# Patient Record
Sex: FEMALE | Race: WHITE
[De-identification: names, ages, dates, MRNs, and addresses within clinical notes are randomized per-mention and may not be internally consistent; named-entity substitution may affect disease eponyms.]

---

## 2019-11-26 ENCOUNTER — HOSPITAL ENCOUNTER (OUTPATIENT)
Dept: HOSPITAL 8 - LDOP | Age: 29
Discharge: HOME | End: 2019-11-26
Attending: OBSTETRICS & GYNECOLOGY
Payer: SELF-PAY

## 2019-11-26 VITALS — SYSTOLIC BLOOD PRESSURE: 106 MMHG | DIASTOLIC BLOOD PRESSURE: 60 MMHG

## 2019-11-26 VITALS — BODY MASS INDEX: 24.86 KG/M2 | HEIGHT: 63 IN | WEIGHT: 140.28 LBS

## 2019-11-26 DIAGNOSIS — O46.92: ICD-10-CM

## 2019-11-26 DIAGNOSIS — Z3A.22: ICD-10-CM

## 2019-11-26 LAB
BASOPHILS # BLD AUTO: 0.03 X10^3/UL (ref 0–0.1)
BASOPHILS NFR BLD AUTO: 0 % (ref 0–1)
EOSINOPHIL # BLD AUTO: 0.19 X10^3/UL (ref 0–0.4)
EOSINOPHIL NFR BLD AUTO: 2 % (ref 1–7)
ERYTHROCYTE [DISTWIDTH] IN BLOOD BY AUTOMATED COUNT: 13.4 % (ref 9.6–15.2)
LYMPHOCYTES # BLD AUTO: 1.38 X10^3/UL (ref 1–3.4)
LYMPHOCYTES NFR BLD AUTO: 15 % (ref 22–44)
MCH RBC QN AUTO: 30.7 PG (ref 27–34.8)
MCHC RBC AUTO-ENTMCNC: 33.6 G/DL (ref 32.4–35.8)
MCV RBC AUTO: 91.6 FL (ref 80–100)
MD: NO
MICROSCOPIC: (no result)
MONOCYTES # BLD AUTO: 0.42 X10^3/UL (ref 0.2–0.8)
MONOCYTES NFR BLD AUTO: 5 % (ref 2–9)
NEUTROPHILS # BLD AUTO: 7.19 X10^3/UL (ref 1.8–6.8)
NEUTROPHILS NFR BLD AUTO: 78 % (ref 42–75)
PLATELET # BLD AUTO: 214 X10^3/UL (ref 130–400)
PMV BLD AUTO: 9.1 FL (ref 7.4–10.4)
RBC # BLD AUTO: 3.73 X10^6/UL (ref 3.82–5.3)

## 2019-11-26 PROCEDURE — 87086 URINE CULTURE/COLONY COUNT: CPT

## 2019-11-26 PROCEDURE — 99211 OFF/OP EST MAY X REQ PHY/QHP: CPT

## 2019-11-26 PROCEDURE — 86592 SYPHILIS TEST NON-TREP QUAL: CPT

## 2019-11-26 PROCEDURE — 86900 BLOOD TYPING SEROLOGIC ABO: CPT

## 2019-11-26 PROCEDURE — 81001 URINALYSIS AUTO W/SCOPE: CPT

## 2019-11-26 PROCEDURE — 86762 RUBELLA ANTIBODY: CPT

## 2019-11-26 PROCEDURE — 87389 HIV-1 AG W/HIV-1&-2 AB AG IA: CPT

## 2019-11-26 PROCEDURE — G0463 HOSPITAL OUTPT CLINIC VISIT: HCPCS

## 2019-11-26 PROCEDURE — 87340 HEPATITIS B SURFACE AG IA: CPT

## 2019-11-26 PROCEDURE — 36415 COLL VENOUS BLD VENIPUNCTURE: CPT

## 2019-11-26 PROCEDURE — 76805 OB US >/= 14 WKS SNGL FETUS: CPT

## 2019-11-26 PROCEDURE — 86850 RBC ANTIBODY SCREEN: CPT

## 2019-11-26 PROCEDURE — 85025 COMPLETE CBC W/AUTO DIFF WBC: CPT

## 2019-12-15 ENCOUNTER — APPOINTMENT (OUTPATIENT)
Dept: RADIOLOGY | Facility: MEDICAL CENTER | Age: 29
End: 2019-12-15
Payer: OTHER MISCELLANEOUS

## 2019-12-15 ENCOUNTER — APPOINTMENT (OUTPATIENT)
Dept: RADIOLOGY | Facility: MEDICAL CENTER | Age: 29
End: 2019-12-15
Attending: OBSTETRICS & GYNECOLOGY
Payer: COMMERCIAL

## 2019-12-15 ENCOUNTER — HOSPITAL ENCOUNTER (OUTPATIENT)
Facility: MEDICAL CENTER | Age: 29
End: 2019-12-15
Attending: OBSTETRICS & GYNECOLOGY | Admitting: OBSTETRICS & GYNECOLOGY
Payer: COMMERCIAL

## 2019-12-15 ENCOUNTER — HOSPITAL ENCOUNTER (EMERGENCY)
Facility: MEDICAL CENTER | Age: 29
End: 2019-12-15
Attending: EMERGENCY MEDICINE
Payer: OTHER MISCELLANEOUS

## 2019-12-15 VITALS
DIASTOLIC BLOOD PRESSURE: 62 MMHG | HEIGHT: 63 IN | BODY MASS INDEX: 24.8 KG/M2 | RESPIRATION RATE: 16 BRPM | TEMPERATURE: 98.2 F | WEIGHT: 140 LBS | HEART RATE: 85 BPM | SYSTOLIC BLOOD PRESSURE: 110 MMHG | OXYGEN SATURATION: 98 %

## 2019-12-15 VITALS
SYSTOLIC BLOOD PRESSURE: 109 MMHG | BODY MASS INDEX: 25.76 KG/M2 | TEMPERATURE: 98.4 F | HEART RATE: 70 BPM | HEIGHT: 62 IN | DIASTOLIC BLOOD PRESSURE: 72 MMHG | WEIGHT: 140 LBS
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
## 2019-12-15 DIAGNOSIS — R10.31 RIGHT LOWER QUADRANT ABDOMINAL PAIN: ICD-10-CM

## 2019-12-15 DIAGNOSIS — Z3A.24 24 WEEKS GESTATION OF PREGNANCY: ICD-10-CM

## 2019-12-15 LAB
ADULT RBCS COUNTED 8505ARB2: 4950 ADULT RBC
ALBUMIN SERPL BCP-MCNC: 3.4 G/DL (ref 3.2–4.9)
ALBUMIN/GLOB SERPL: 1 G/DL
ALP SERPL-CCNC: 57 U/L (ref 30–99)
ALT SERPL-CCNC: 12 U/L (ref 2–50)
ANION GAP SERPL CALC-SCNC: 6 MMOL/L (ref 0–11.9)
APTT PPP: 25.8 SEC (ref 24.7–36)
AST SERPL-CCNC: 15 U/L (ref 12–45)
BILIRUB SERPL-MCNC: 0.5 MG/DL (ref 0.1–1.5)
BUN SERPL-MCNC: 11 MG/DL (ref 8–22)
CALCIUM SERPL-MCNC: 8.6 MG/DL (ref 8.5–10.5)
CHLORIDE SERPL-SCNC: 111 MMOL/L (ref 96–112)
CO2 SERPL-SCNC: 20 MMOL/L (ref 20–33)
CREAT SERPL-MCNC: 0.45 MG/DL (ref 0.5–1.4)
ERYTHROCYTE [DISTWIDTH] IN BLOOD BY AUTOMATED COUNT: 41.1 FL (ref 35.9–50)
ETHANOL BLD-MCNC: 0 G/DL
FETAL RBC PERCENT 8505FRBP: 0 %
FETAL STAIN FRBC 8505FRBC: 0 FETAL RBC
GLOBULIN SER CALC-MCNC: 3.4 G/DL (ref 1.9–3.5)
GLUCOSE SERPL-MCNC: 94 MG/DL (ref 65–99)
HCG SERPL QL: POSITIVE
HCT VFR BLD AUTO: 35.9 % (ref 37–47)
HGB BLD-MCNC: 11.8 G/DL (ref 12–16)
INR PPP: 0.93 (ref 0.87–1.13)
MCH RBC QN AUTO: 29.8 PG (ref 27–33)
MCHC RBC AUTO-ENTMCNC: 32.9 G/DL (ref 33.6–35)
MCV RBC AUTO: 90.7 FL (ref 81.4–97.8)
NUMBER OF RH DOSES IND 8505RD: NORMAL
NUMBER OF RH DOSES IND 8505RD: NORMAL # RHIG
PLATELET # BLD AUTO: 248 K/UL (ref 164–446)
PMV BLD AUTO: 10.7 FL (ref 9–12.9)
POTASSIUM SERPL-SCNC: 3.4 MMOL/L (ref 3.6–5.5)
PROT SERPL-MCNC: 6.8 G/DL (ref 6–8.2)
PROTHROMBIN TIME: 12.7 SEC (ref 12–14.6)
RBC # BLD AUTO: 3.96 M/UL (ref 4.2–5.4)
SODIUM SERPL-SCNC: 137 MMOL/L (ref 135–145)
WBC # BLD AUTO: 9.6 K/UL (ref 4.8–10.8)
WEAK D AG RBC QL: NORMAL

## 2019-12-15 PROCEDURE — 86901 BLOOD TYPING SEROLOGIC RH(D): CPT

## 2019-12-15 PROCEDURE — 36415 COLL VENOUS BLD VENIPUNCTURE: CPT

## 2019-12-15 PROCEDURE — 80053 COMPREHEN METABOLIC PANEL: CPT

## 2019-12-15 PROCEDURE — 80307 DRUG TEST PRSMV CHEM ANLYZR: CPT

## 2019-12-15 PROCEDURE — 99285 EMERGENCY DEPT VISIT HI MDM: CPT

## 2019-12-15 PROCEDURE — 85610 PROTHROMBIN TIME: CPT

## 2019-12-15 PROCEDURE — 84703 CHORIONIC GONADOTROPIN ASSAY: CPT

## 2019-12-15 PROCEDURE — 85460 HEMOGLOBIN FETAL: CPT

## 2019-12-15 PROCEDURE — 305948 HCHG GREEN TRAUMA ACT PRE-NOTIFY NO CC

## 2019-12-15 PROCEDURE — 59025 FETAL NON-STRESS TEST: CPT

## 2019-12-15 PROCEDURE — 85730 THROMBOPLASTIN TIME PARTIAL: CPT

## 2019-12-15 PROCEDURE — 85027 COMPLETE CBC AUTOMATED: CPT

## 2019-12-15 PROCEDURE — 76815 OB US LIMITED FETUS(S): CPT

## 2019-12-15 SDOH — HEALTH STABILITY: MENTAL HEALTH: HOW OFTEN DO YOU HAVE A DRINK CONTAINING ALCOHOL?: NEVER

## 2019-12-15 ASSESSMENT — PAIN SCALES - WONG BAKER: WONGBAKER_NUMERICALRESPONSE: HURTS A LITTLE MORE
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
## 2019-12-15 NOTE — ED NOTES
Pt verbalizes understanding of discharge and follow-up instructions.  VSS.  All questions answered. Pt to L&D for further monitoring on wheelchair.  Gave report to L&D charge RN.

## 2020-01-28 ENCOUNTER — INITIAL PRENATAL (OUTPATIENT)
Dept: OBGYN | Facility: CLINIC | Age: 30
End: 2020-01-28
Payer: MEDICAID

## 2020-01-28 VITALS — SYSTOLIC BLOOD PRESSURE: 120 MMHG | DIASTOLIC BLOOD PRESSURE: 62 MMHG | BODY MASS INDEX: 29.45 KG/M2 | WEIGHT: 161 LBS

## 2020-01-28 DIAGNOSIS — Z87.59 HISTORY OF IUFD: ICD-10-CM

## 2020-01-28 DIAGNOSIS — Z34.80 SUPERVISION OF OTHER NORMAL PREGNANCY: Primary | ICD-10-CM

## 2020-01-28 PROCEDURE — 0500F INITIAL PRENATAL CARE VISIT: CPT | Performed by: NURSE PRACTITIONER

## 2020-01-28 NOTE — PATIENT INSTRUCTIONS
P:  1.  GC/CT and Pap UTD per pt.         2.  28wk labs ordered - lab slip given        3.  Discussed PNV, diet, and adequate water intake        4.  NOB packet given        5.  Return to office in 2 wks        6.  Records requested.        7.  First trimester screening - too late.        8.  Tdap offered - pt would like to think about it.        9.  Instructions given on FKCs.      10.   Work restriction letter given.

## 2020-01-28 NOTE — LETTER
January 28, 2020              Lakia Rosario is currently being cared for at [facility].  Her hours/activities need to be modified.  She should not lift over 20 pounds.  She also needs frequent rest breaks (10 min/every 2 hr) in addition to her regularly scheduled meal breaks.  Please let me know if you have any questions.          Thank you,          PAN Cam.    Electronically Signed

## 2020-01-28 NOTE — PROGRESS NOTES
Pt. Here for NOB visit today.  # [phone]  First prenatal care  Pt. States no concerns   Pharmacy verified  Chaperone offered and accepted

## 2020-01-28 NOTE — LETTER
"Count Your Baby's Movements  Another step to a healthy delivery                Dept: [phone]    How Many Weeks Pregnant? 30w6d    Date to Begin Countin2020                How to use this chart    One way for your physician to keep track of your baby's health is by knowing how often the baby moves (or \"kicks\") in your womb.  You can help your physician to do this by using this chart every day.    Every day, you should see how many hours it takes for your baby to move 10 times.  Start in the morning, as soon as you get up.    · First, write down the time your baby moves until you get to 10.  · Check off one box every time your baby moves until you get to 10.  · Write down the time you finished counting in the last column.  · Total how long it took to count up all 10 movements.  · Finally, fill in the box that shows how long this took.  After counting 10 movements, you no longer have to count any more that day.  The next morning, just start counting again as soon as you get up.    What should you call a \"movement\"?  It is hard to say, because it will feel different from one mother to another and from one pregnancy to the next.  The important thing is that you count the movements the same way throughout your pregnancy.  If you have more questions, you should ask your physician.    Count carefully every day!  SAMPLE:  Week 28    How many hours did it take to feel 10 movements?       Start  Time     1     2     3     4     5     6     7     8     9     10   Finish Time   Mon 8:20 ·  ·  ·  ·  ·  ·  ·  ·  ·  ·  11:40                  Sat               Sun                 IMPORTANT: You should contact your physician if it takes more than two hours for you to feel 10 movements.  Each morning, write down the time and start to count the movements of your baby.  Keep track by checking off one box every time you feel one movement.  When you have felt 10 " "\"kicks\", write down the time you finished counting in the last column.  Then fill in the   box (over the check fatimah) for the number of hours it took.  Be sure to read the complete instructions on the previous page.            "

## 2020-01-28 NOTE — PROGRESS NOTES
Subjective:   Lakia Rosario is a 30 y.o.   @ EGA: 30w6d SABI: Estimated Date of Delivery: 20  per US who presents for her new OB exam.  She c/o cramping.  Too late for AFP.  Declines CF.  Reports good FM.  Denies VB, LOF, or cramping.  Denies dysuria, vaginal DC.  Pt is single and lives with boyfriend and child. Works at the [employer].  Pregnancy is unplanned but wanted.  Too late for [facility].  Reports PNC @ [facility] - hasn't been seen in awhile.     No chief complaint on file.          HPI  Review of Systems   All other systems reviewed and are negative.       Objective:     /62   Wt 73 kg (161 lb)   BMI 29.45 kg/m²     See H&P Prenatal Physical.          Wet mount: deferred        FHTs: 150        Fundal ht: 31       Physical Exam  Vitals signs and nursing note reviewed.   Constitutional:       Appearance: Normal appearance. She is well-developed.   HENT:      Head: Normocephalic and atraumatic.   Eyes:      Comments: Eye and ear exam deferred   Neck:      Musculoskeletal: Normal range of motion and neck supple.      Thyroid: No thyromegaly.   Cardiovascular:      Rate and Rhythm: Normal rate and regular rhythm.      Heart sounds: Normal heart sounds.   Pulmonary:      Effort: Pulmonary effort is normal.      Breath sounds: Normal breath sounds.   Abdominal:      General: Bowel sounds are normal.      Palpations: Abdomen is soft.   Genitourinary:     Exam position: Supine.      Vagina: Normal.      Comments: Deferred   x 1 - proven to 3100g  Musculoskeletal: Normal range of motion.   Skin:     General: Skin is warm and dry.      Capillary Refill: Capillary refill takes less than 2 seconds.   Neurological:      Mental Status: She is alert and oriented to person, place, and time.   Psychiatric:         Mood and Affect: Mood normal.         Behavior: Behavior normal.         Thought Content: Thought content normal.         Judgment: Judgment normal.            A:    1.  IUP @ 30w6d SABI: Estimated Date of Delivery: 4/1/20 per [facility]         2.  S=D        3.    Patient Active Problem List    Diagnosis Date Noted   • Supervision of other normal pregnancy 01/28/2020   • History of IUFD 01/28/2020         P:  1.  GC/CT and Pap UTD per pt.         2.  28wk labs ordered - lab slip given        3.  Discussed PNV, diet, and adequate water intake        4.  NOB packet given        5.  Return to office in 2 wks        6.  Records requested.        7.  First trimester screening - too late.        8.  Tdap offered - pt would like to think about it.        9.  Instructions given on FKCs.      10.   Work restriction letter given.

## 2020-02-11 ENCOUNTER — ROUTINE PRENATAL (OUTPATIENT)
Dept: OBGYN | Facility: CLINIC | Age: 30
End: 2020-02-11
Payer: MEDICAID

## 2020-02-11 VITALS — DIASTOLIC BLOOD PRESSURE: 64 MMHG | WEIGHT: 164 LBS | BODY MASS INDEX: 30 KG/M2 | SYSTOLIC BLOOD PRESSURE: 122 MMHG

## 2020-02-11 DIAGNOSIS — Z34.80 SUPERVISION OF OTHER NORMAL PREGNANCY: Primary | ICD-10-CM

## 2020-02-11 PROBLEM — Z87.59 HISTORY OF IUFD: Status: RESOLVED | Noted: 2020-01-28 | Resolved: 2020-02-11

## 2020-02-11 PROCEDURE — 90040 PR PRENATAL FOLLOW UP: CPT | Performed by: NURSE PRACTITIONER

## 2020-02-12 DIAGNOSIS — R73.09 ABNORMAL GTT (GLUCOSE TOLERANCE TEST): Primary | ICD-10-CM

## 2020-02-12 NOTE — PATIENT INSTRUCTIONS
P:  1.  GBS @ 36 wks.          2.  Continue FKCs.          3.  Questions answered.          4.  Encouraged pt to tour L&D.          5.  Encourage adequate water intake.        6.  F/u 2 wks.        7.  Labs are pending.        8.  D/w pt helps for pelvic pain. May use pregnancy belt.

## 2020-02-12 NOTE — PROGRESS NOTES
S:  Pt is  at 32w6d for routine OB follow up.  Reports some occ pelvic pressure and cramps.  Reports good FM.  Denies VB, LOF, RUCs or vaginal DC.    O:  Please see above vitals.        FHTs: 152        Fundal ht: 32 cm.    A:  IUP at 32w6d  Patient Active Problem List    Diagnosis Date Noted   • Supervision of other normal pregnancy 2020        P:  1.  GBS @ 36 wks.          2.  Continue FKCs.          3.  Questions answered.          4.  Encouraged pt to tour L&D.          5.  Encourage adequate water intake.        6.  F/u 2 wks.        7.  Labs are pending.        8.  D/w pt helps for pelvic pain. May use pregnancy belt.

## 2020-02-12 NOTE — PROGRESS NOTES
Pt here today for OB follow up  Pt states having irregular cx's, also having vaginal pressure.   Reports +FM  Good # [phone]  Pharmacy Confirmed.  Chaperone offered and declined.   Pt states did Labs today at [facility].

## 2020-02-13 LAB
ERYTHROCYTE [DISTWIDTH] IN BLOOD BY AUTOMATED COUNT: 13.8 % (ref 11.7–15.4)
GLUCOSE 1H P 50 G GLC PO SERPL-MCNC: 161 MG/DL (ref 65–139)
HCT VFR BLD AUTO: 33.5 % (ref 34–46.6)
HGB BLD-MCNC: 11 G/DL (ref 11.1–15.9)
MCH RBC QN AUTO: 29.3 PG (ref 26.6–33)
MCHC RBC AUTO-ENTMCNC: 32.8 G/DL (ref 31.5–35.7)
MCV RBC AUTO: 89 FL (ref 79–97)
PLATELET # BLD AUTO: 212 X10E3/UL (ref 150–450)
RBC # BLD AUTO: 3.76 X10E6/UL (ref 3.77–5.28)
TREPONEMA PALLIDUM IGG+IGM AB [PRESENCE] IN SERUM OR PLASMA BY IMMUNOASSAY: NON REACTIVE
WBC # BLD AUTO: 10.1 X10E3/UL (ref 3.4–10.8)

## 2020-02-18 ENCOUNTER — TELEPHONE (OUTPATIENT)
Dept: OBGYN | Facility: CLINIC | Age: 30
End: 2020-02-18

## 2020-02-18 NOTE — TELEPHONE ENCOUNTER
"Pt called c/o pelvic discomfort, denies any vaginal bleeding, LOF and UC. Pt states her pelvic are feels \"heavy\".. Adv pt she can use maternity support belt, hydrate and may take Tylenol for pain and labor precautions given. Pt understood and will comply, she had no other questions or concerns  "

## 2020-02-21 ENCOUNTER — HOSPITAL ENCOUNTER (OUTPATIENT)
Facility: MEDICAL CENTER | Age: 30
End: 2020-02-21
Attending: OBSTETRICS & GYNECOLOGY | Admitting: OBSTETRICS & GYNECOLOGY
Payer: MEDICAID

## 2020-02-21 VITALS
BODY MASS INDEX: 30.18 KG/M2 | RESPIRATION RATE: 18 BRPM | SYSTOLIC BLOOD PRESSURE: 117 MMHG | TEMPERATURE: 98 F | HEART RATE: 90 BPM | HEIGHT: 62 IN | WEIGHT: 164 LBS | DIASTOLIC BLOOD PRESSURE: 71 MMHG | OXYGEN SATURATION: 98 %

## 2020-02-21 LAB
APPEARANCE UR: ABNORMAL
APPEARANCE UR: ABNORMAL
BACTERIA #/AREA URNS HPF: NEGATIVE /HPF
BILIRUB UR QL STRIP.AUTO: NEGATIVE
COLOR UR AUTO: ABNORMAL
COLOR UR: ABNORMAL
EPI CELLS #/AREA URNS HPF: ABNORMAL /HPF
GLUCOSE UR QL STRIP.AUTO: NEGATIVE MG/DL
GLUCOSE UR STRIP.AUTO-MCNC: NEGATIVE MG/DL
HYALINE CASTS #/AREA URNS LPF: ABNORMAL /LPF
KETONES UR QL STRIP.AUTO: 15 MG/DL
KETONES UR STRIP.AUTO-MCNC: ABNORMAL MG/DL
LEUKOCYTE ESTERASE UR QL STRIP.AUTO: ABNORMAL
LEUKOCYTE ESTERASE UR QL STRIP.AUTO: ABNORMAL
MICRO URNS: ABNORMAL
NITRITE UR QL STRIP.AUTO: NEGATIVE
NITRITE UR QL STRIP.AUTO: NEGATIVE
PH UR STRIP.AUTO: 6 [PH] (ref 5–8)
PH UR STRIP.AUTO: 6 [PH] (ref 5–8)
PROT UR QL STRIP: 30 MG/DL
PROT UR QL STRIP: 30 MG/DL
RBC # URNS HPF: >150 /HPF
RBC UR QL AUTO: ABNORMAL
RBC UR QL AUTO: ABNORMAL
SP GR UR STRIP.AUTO: 1.02
SP GR UR: 1.02 (ref 1–1.03)
UROBILINOGEN UR STRIP.AUTO-MCNC: 1 MG/DL
WBC #/AREA URNS HPF: ABNORMAL /HPF

## 2020-02-21 PROCEDURE — 700111 HCHG RX REV CODE 636 W/ 250 OVERRIDE (IP): Performed by: OBSTETRICS & GYNECOLOGY

## 2020-02-21 PROCEDURE — 81001 URINALYSIS AUTO W/SCOPE: CPT

## 2020-02-21 PROCEDURE — 81002 URINALYSIS NONAUTO W/O SCOPE: CPT

## 2020-02-21 PROCEDURE — 700102 HCHG RX REV CODE 250 W/ 637 OVERRIDE(OP): Performed by: STUDENT IN AN ORGANIZED HEALTH CARE EDUCATION/TRAINING PROGRAM

## 2020-02-21 PROCEDURE — 96372 THER/PROPH/DIAG INJ SC/IM: CPT

## 2020-02-21 PROCEDURE — A9270 NON-COVERED ITEM OR SERVICE: HCPCS | Performed by: STUDENT IN AN ORGANIZED HEALTH CARE EDUCATION/TRAINING PROGRAM

## 2020-02-21 PROCEDURE — 59025 FETAL NON-STRESS TEST: CPT

## 2020-02-21 RX ORDER — ACETAMINOPHEN 325 MG/1
650 TABLET ORAL EVERY 4 HOURS PRN
Status: DISCONTINUED | OUTPATIENT
Start: 2020-02-21 | End: 2020-02-21 | Stop reason: HOSPADM

## 2020-02-21 RX ORDER — BETAMETHASONE SODIUM PHOSPHATE AND BETAMETHASONE ACETATE 3; 3 MG/ML; MG/ML
12 INJECTION, SUSPENSION INTRA-ARTICULAR; INTRALESIONAL; INTRAMUSCULAR; SOFT TISSUE ONCE
Status: COMPLETED | OUTPATIENT
Start: 2020-02-21 | End: 2020-02-21

## 2020-02-21 RX ADMIN — BETAMETHASONE SODIUM PHOSPHATE AND BETAMETHASONE ACETATE 12 MG: 3; 3 INJECTION, SUSPENSION INTRA-ARTICULAR; INTRALESIONAL; INTRAMUSCULAR at 13:14

## 2020-02-21 RX ADMIN — ACETAMINOPHEN 650 MG: 325 TABLET, FILM COATED ORAL at 12:31

## 2020-02-21 ASSESSMENT — PAIN SCALES - GENERAL: PAINLEVEL: 5 - MODERATE PAIN

## 2020-02-21 NOTE — PROGRESS NOTES
Presents at 34.2 wk for constant sharp lower back pain that is worse when she has UCs. States she has been alex irregularly for many weeks. Also c/o blood in her urine. Denies burning when she voids. Denies LOF or VB; reports FM. Pt has mild right flank pain but pain is mostly in her middle lower back; reports pain as 5/10. Pt appears calm and comfortable. Also c/o constant perineal pressure. SVE 3/0/-4; no blood on exam glove. Urine dipped and sent to lab for UA. Water provided; instructed to hydrate since she is dehydrated which can cause cramping. RN awaiting for MDs to be available  1125; Report given to Dr. Hawkins; at bedside to evaluate  1220: Orders received from MD. MD informed pt that the blood in her urine is most likely caused by kidney stone, but there isn't much to be done while she is pregnant. Pain is tolerable and pt appears comfortable. Educated to drink more water and empty bladder when needed to flush it out. Pt can return here if pain gets worse and doesn't respond to heat pack and Tylenol. Educated about importance of betamethasone in case she delivers early.   1320: SVE 3-4/th/-4. Dr. Hawkins notified to make sure plan is still to discharge with this little change. MD reviewed tracing. D/c orders obtained. Pt is to return tomorrow at 1300 for 2nd steroid injection and SVE. PTL precautions provided; instructed to return earlier if UCs become stronger and closer together

## 2020-02-21 NOTE — DISCHARGE INSTRUCTIONS
General Instructions:  · If you think you are in labor, time contractions (lying on your left side) from the beginning of one contraction to the beginning of the next contraction for at least one hour.  · Increase fluid intake: you should consume 10-12 8 oz glasses of non-caffeinated fluid per day.  · Report any pressure or burning on urination to your physician.  · Monitor fetal movement: If you notice an absence or decrease in fetal movement, drink a large glass of water and rest on your side.  If there is no increase in movement, call your physician or go to the hospital for further evaluation.  · Report any sudden, sharp abdominal pain.  · Report any bleeding.  Spotting or pinkish discharge is normal after vaginal exam.  You may also spot after sexual intercourse.    Pre-term Labor (<37 weeks):  Call your physician or return to the hospital if:  · You have painless regular contractions more than 4 in one hour.  · Your water breaks (remember time and color).  · You have menstrual-like cramps, a low dull backache or pressure in your pelvis or back.  · Your baby does not move enough to complete the daily kick count (10 movements in 2 hours).  · Your baby moves much less often than on the days before or you have not felt your baby move all day.  · Please review the MEDICATION LIST section of your AFTER VISIT SUMMARY document.  · Take your medication as prescribed      Other Instructions  Take Tylenol and use hot packs for lower back pain. If pain gets worse, return here. Return to L&D tomorrow at 1 pm for 2nd steroid injection and cervical exam

## 2020-02-21 NOTE — PROGRESS NOTES
"[facility] LABOR AND DELIVERY TRIAGE PROGRESS NOTE    PATIENT ID:  NAME:  Lakia Rosario  MRN:               [id number]  YOB: 1990     30 y.o. female  at 34w2d.    Subjective: Pt presents for a sharp R sided LBP for the past several weeks that comes and goes, but is mostly constant, and worsens with contractions.  She is unsure what specifically causes the pain, but denies anything that alleviates it.  She has also noted blood in her urine starting last night.  Denies changes in pain, injury, or other changes last night prior to hematuria.  Denies dysuria, no blood when wiping, no blood in underwear, solely with urination.    Pregnancy complicated by None    positive  For CTXS. Infrequent   positive Feels pain in R lower back  negative for LOF  negative for vaginal bleeding.   positive for fetal movement    ROS: Patient denies any fever chills, nausea, vomiting, headache, chest pain, shortness of breath, or dysuria or unusual swelling of hands or feet.     Objective:    Vitals:    20 1000 20 1016 20 1018   BP:  117/71 117/71   Pulse:  90 90   Resp:   18   Temp:   36.7 °C (98 °F)   TempSrc:   Temporal   SpO2:   98%   Weight: 74.4 kg (164 lb)     Height: 1.575 m (5' 2\")       Temp (24hrs), Av.7 °C (98 °F), Min:36.7 °C (98 °F), Max:36.7 °C (98 °F)    General: No acute distress, resting comfortably in bed.  HEENT: normocephalic, nontraumatic, PERRLA, EOMI  Cardiovascular: Heart RRR with no murmurs, rubs or gallops. Distal Pulses 2+  Respiratory: symmetric chest expansion, lungs CTAB, with no wheezes, rales, rhonci  Abdomen: gravid, nontender  Musculoskeletal: strength 5/5 in four extremities  Neuro: non focal with no numbness, tingling or changes in sensation    Cervix:  3cm/0%/-4  [place]: Uterine irritability.   FHRM: Baseline 140, Accels present, single variable decel, moderate variability    Assessment: 30 y.o. female  at 34w2d. Pain possibly related to  labor.  " Hematuria possible kidney stone, no evidence of UTI.    Plan:   1. Patient is cleared to return home with family. Encouraged to see MD for increased painful uterine contractions @ 3-5, vaginal bleeding, loss of fluid, or other serious symptoms.  2. First dose betamethasone today  3. Return tomorrow for second dose betamethasone and recheck cervix  4. Labor precautions reviewed  5. Sufficient hydration for possible nephrolith vs physiologic hydronephrosis    Discussed case with Dr. Frank, [facility] Attending. Case was discussed and attending agreed with plan prior to discharge of patient.    Shawn Hwakins M.D.

## 2020-02-22 ENCOUNTER — HOSPITAL ENCOUNTER (OUTPATIENT)
Facility: MEDICAL CENTER | Age: 30
End: 2020-02-22
Attending: OBSTETRICS & GYNECOLOGY | Admitting: OBSTETRICS & GYNECOLOGY
Payer: MEDICAID

## 2020-02-22 VITALS
HEART RATE: 91 BPM | TEMPERATURE: 97.5 F | BODY MASS INDEX: 30.18 KG/M2 | SYSTOLIC BLOOD PRESSURE: 117 MMHG | WEIGHT: 164 LBS | DIASTOLIC BLOOD PRESSURE: 71 MMHG | HEIGHT: 62 IN

## 2020-02-22 LAB
APPEARANCE UR: ABNORMAL
COLOR UR AUTO: YELLOW
GLUCOSE UR QL STRIP.AUTO: 500 MG/DL
KETONES UR QL STRIP.AUTO: 15 MG/DL
LEUKOCYTE ESTERASE UR QL STRIP.AUTO: ABNORMAL
NITRITE UR QL STRIP.AUTO: NEGATIVE
PH UR STRIP.AUTO: 6 [PH] (ref 5–8)
PROT UR QL STRIP: ABNORMAL MG/DL
RBC UR QL AUTO: ABNORMAL
SP GR UR: 1.02 (ref 1–1.03)

## 2020-02-22 PROCEDURE — 81002 URINALYSIS NONAUTO W/O SCOPE: CPT

## 2020-02-22 PROCEDURE — 700111 HCHG RX REV CODE 636 W/ 250 OVERRIDE (IP): Performed by: OBSTETRICS & GYNECOLOGY

## 2020-02-22 PROCEDURE — 59025 FETAL NON-STRESS TEST: CPT

## 2020-02-22 PROCEDURE — 96372 THER/PROPH/DIAG INJ SC/IM: CPT

## 2020-02-22 RX ORDER — BETAMETHASONE SODIUM PHOSPHATE AND BETAMETHASONE ACETATE 3; 3 MG/ML; MG/ML
12 INJECTION, SUSPENSION INTRA-ARTICULAR; INTRALESIONAL; INTRAMUSCULAR; SOFT TISSUE ONCE
Status: COMPLETED | OUTPATIENT
Start: 2020-02-22 | End: 2020-02-22

## 2020-02-22 RX ADMIN — BETAMETHASONE SODIUM PHOSPHATE AND BETAMETHASONE ACETATE 12 MG: 3; 3 INJECTION, SUSPENSION INTRA-ARTICULAR; INTRALESIONAL; INTRAMUSCULAR at 13:49

## 2020-02-22 NOTE — PROGRESS NOTES
PT Returning for 2nd steroid shot.    1350 Report to jamel Allen to give steroid per MAR and not preform SVE.    1355 PT to return for decreased FM, LOF, VB or VB.

## 2020-02-27 ENCOUNTER — HOSPITAL ENCOUNTER (OUTPATIENT)
Facility: MEDICAL CENTER | Age: 30
End: 2020-02-27
Attending: NURSE PRACTITIONER
Payer: MEDICAID

## 2020-02-27 ENCOUNTER — ROUTINE PRENATAL (OUTPATIENT)
Dept: OBGYN | Facility: CLINIC | Age: 30
End: 2020-02-27
Payer: MEDICAID

## 2020-02-27 VITALS — SYSTOLIC BLOOD PRESSURE: 126 MMHG | DIASTOLIC BLOOD PRESSURE: 72 MMHG

## 2020-02-27 DIAGNOSIS — Z34.83 ENCOUNTER FOR SUPERVISION OF OTHER NORMAL PREGNANCY IN THIRD TRIMESTER: ICD-10-CM

## 2020-02-27 DIAGNOSIS — Z34.83 ENCOUNTER FOR SUPERVISION OF OTHER NORMAL PREGNANCY IN THIRD TRIMESTER: Primary | ICD-10-CM

## 2020-02-27 PROCEDURE — 87150 DNA/RNA AMPLIFIED PROBE: CPT

## 2020-02-27 PROCEDURE — 87081 CULTURE SCREEN ONLY: CPT

## 2020-02-27 PROCEDURE — 90040 PR PRENATAL FOLLOW UP: CPT | Performed by: NURSE PRACTITIONER

## 2020-02-27 ASSESSMENT — PATIENT HEALTH QUESTIONNAIRE - PHQ9: CLINICAL INTERPRETATION OF PHQ2 SCORE: 0

## 2020-02-28 NOTE — PROGRESS NOTES
S:  Pt is  at 35w1d here for routine OB follow up.  Reports 1-2 CTX/hr. Was seen in hospital on  for blood in urine. VE was 3/0/-4 at that time, she recd BMZ.  Reports good FM.  Denies VB, LOF, RUCs, or vaginal DC. Discussed getting GBS sooner in the event she does not make it to her due date.    O:  Please see above vitals.        FHTs: 140        Fundal ht: 35 cm.        S=D        Fetal position: vertex        VE: 350/-2.    A:  IUP at 35w1d  Patient Active Problem List    Diagnosis Date Noted   • Supervision of other normal pregnancy 2020       P:  1.  Labor precautions given.  Instructions given on where to go.  Pt receptive to              education.          2.  Questions answered.          4.  Reviewed PTL precautions.        5.  Continue FKCs.          6.  Encouraged adequate water intake        7.  D/w pt walking 30 min daily,        8.  F/u in 1 week

## 2020-02-28 NOTE — PROGRESS NOTES
Pt here today for OB follow up  Pt states off and on contractions - not consistent , Denies VB   Reports +FM  Good # [phone]  Pharmacy Confirmed w/ pt

## 2020-02-29 LAB — GP B STREP DNA SPEC QL NAA+PROBE: NEGATIVE

## 2020-03-05 ENCOUNTER — ROUTINE PRENATAL (OUTPATIENT)
Dept: OBGYN | Facility: CLINIC | Age: 30
End: 2020-03-05
Payer: MEDICAID

## 2020-03-05 VITALS — DIASTOLIC BLOOD PRESSURE: 60 MMHG | SYSTOLIC BLOOD PRESSURE: 124 MMHG | WEIGHT: 170.6 LBS | BODY MASS INDEX: 31.2 KG/M2

## 2020-03-05 DIAGNOSIS — Z34.83 ENCOUNTER FOR SUPERVISION OF OTHER NORMAL PREGNANCY IN THIRD TRIMESTER: Primary | ICD-10-CM

## 2020-03-05 PROCEDURE — 90715 TDAP VACCINE 7 YRS/> IM: CPT | Performed by: NURSE PRACTITIONER

## 2020-03-05 PROCEDURE — 90040 PR PRENATAL FOLLOW UP: CPT | Performed by: NURSE PRACTITIONER

## 2020-03-05 PROCEDURE — 90471 IMMUNIZATION ADMIN: CPT | Performed by: NURSE PRACTITIONER

## 2020-03-05 ASSESSMENT — FIBROSIS 4 INDEX: FIB4 SCORE: 0.59

## 2020-03-06 NOTE — PROGRESS NOTES
S:  Pt is  at 36w1d here for routine OB follow up.  Reports CTX, questionable ROM, she is not sure if it is fluid or discharge.  Reports good FM.  Denies VB, LOF, RUCs, or vaginal DC.     O:  Please see above vitals.        FHTs: 155        Fundal ht: 36 cm        Fetal position: vertex        S=D        VE: 3/50/-2, no change in exam        Nitrazine: equivocal, fern negative             A:  IUP at 36w1d  Patient Active Problem List    Diagnosis Date Noted   • Supervision of other normal pregnancy 2020       P:  1.  Continue FKCs.         2.  Labor precautions given.  Instructions given on where to go.  Pt receptive to education.         3.  Questions answered.         4.  Encouraged adequate water intake, walking 30-60 minutes daily, bounce ball, pelvic rocking       5.  GBS today       6.  F/u 1wk       7.  Will get 3 hr GTT done before next visit       8. TDAP today

## 2020-03-06 NOTE — PROGRESS NOTES
OB follow up   + fetal movement.  No VB, LOF or UC's.  Wt:  170.6     BP:124/60  Phone #  [phone]  Preferred pharmacy confirmed.  GBS negative  Labs, US, DANIEL given  No TDap given at first appt or second due to pt was thinking about it. TDap was given today per provider was okay. Pt tolerated on the right deltoid at 4:41pm  Was not told about the 3 hr gtt will do before next appt, instructions provided  Clear fluid came out vagina, contractions continued, went down thighs a bit

## 2020-03-09 LAB
GLUCOSE 3H P CHAL SERPL-MCNC: NORMAL MG/DL
GLUCOSE 3H P CHAL SERPL-MCNC: NORMAL MG/DL

## 2020-03-12 ENCOUNTER — ROUTINE PRENATAL (OUTPATIENT)
Dept: OBGYN | Facility: CLINIC | Age: 30
End: 2020-03-12
Payer: MEDICAID

## 2020-03-12 VITALS — WEIGHT: 171 LBS | DIASTOLIC BLOOD PRESSURE: 56 MMHG | BODY MASS INDEX: 31.27 KG/M2 | SYSTOLIC BLOOD PRESSURE: 100 MMHG

## 2020-03-12 DIAGNOSIS — Z34.83 ENCOUNTER FOR SUPERVISION OF OTHER NORMAL PREGNANCY IN THIRD TRIMESTER: Primary | ICD-10-CM

## 2020-03-12 PROCEDURE — 90040 PR PRENATAL FOLLOW UP: CPT | Performed by: NURSE PRACTITIONER

## 2020-03-12 ASSESSMENT — FIBROSIS 4 INDEX: FIB4 SCORE: 0.59

## 2020-03-12 NOTE — PROGRESS NOTES
S:  Pt is  at 37w1d here for routine OB follow up.  Reports CTXs and pelvic pressure. Pt very uncomfortable, moving slowly. Reports good FM.  Denies VB, LOF, RUCs, or vaginal DC.     O:  Please see above vitals.        FHTs: 140        Fundal ht: 37 cm        Fetal position: vertex        SVE: /-2        GBS negative  -- reviewed w pt.      A:  IUP at 37w1d  Patient Active Problem List    Diagnosis Date Noted   • Supervision of other normal pregnancy 2020       P:  1.  Anticipatory guidance given         2.  Labor precautions given.  Instructions given on where to go.  Pt receptive to education.         3.  D/w pt plan for VE and IOL referral at 38 wk visit.        4.  Questions answered.         5.  Encouraged adequate water intake, walking 30-60 min daily, pelvic rocking, birthing ball       6.  F/u at hospital, hospital visiting restrictions reviewed

## 2020-03-12 NOTE — PROGRESS NOTES
Pt here today for OB follow up @ 37w1d  Pt states denies VB and LOF  Reports +FM  Good # [phone]  Pharmacy Confirmed.  GBS -

## 2020-03-13 ENCOUNTER — HOSPITAL ENCOUNTER (OUTPATIENT)
Facility: MEDICAL CENTER | Age: 30
End: 2020-03-14
Attending: OBSTETRICS & GYNECOLOGY | Admitting: OBSTETRICS & GYNECOLOGY
Payer: MEDICAID

## 2020-03-13 ASSESSMENT — FIBROSIS 4 INDEX: FIB4 SCORE: 0.59

## 2020-03-14 VITALS
SYSTOLIC BLOOD PRESSURE: 121 MMHG | TEMPERATURE: 98.2 F | DIASTOLIC BLOOD PRESSURE: 80 MMHG | HEIGHT: 63 IN | HEART RATE: 83 BPM | BODY MASS INDEX: 30.3 KG/M2 | WEIGHT: 171 LBS

## 2020-03-14 PROCEDURE — 59025 FETAL NON-STRESS TEST: CPT

## 2020-03-14 NOTE — PROGRESS NOTES
5287- Pt presented to labor unit with occasional UC's since yesterday after MD office visit. States she was 4cm in office. Denies LOF and VB. Confirms fetal movements. EFM and TOCO applied. Abd soft and non tender to touch. POC discussed. Pt verbalized understanding.    2324- SVE 4/70/-2.  PATRICIA Mcrae CNM notified of pt status and reviewed fetal heart tracing remotely . Orders received to hydrate and monitor at this time.       0036- PATRICIA Mcrae CNM reviewed fetal heart tracing. Orders received to discharge pt home.     0123- Discharge instructions given and explained. Pt verbalized understanding. States she's feeling better. Abd soft and non tender to touch.     0130- Pt discharged home undelivered with FOB.

## 2020-03-20 PROBLEM — R73.09 ELEVATED GLUCOSE TOLERANCE TEST: Status: ACTIVE | Noted: 2020-03-20

## 2020-03-23 ENCOUNTER — ROUTINE PRENATAL (OUTPATIENT)
Dept: OBGYN | Facility: CLINIC | Age: 30
End: 2020-03-23
Payer: MEDICAID

## 2020-03-23 VITALS — BODY MASS INDEX: 30.82 KG/M2 | WEIGHT: 174 LBS | SYSTOLIC BLOOD PRESSURE: 102 MMHG | DIASTOLIC BLOOD PRESSURE: 60 MMHG

## 2020-03-23 DIAGNOSIS — O36.8191 DECREASED FETAL MOVEMENT DURING PREGNANCY, ANTEPARTUM, FETUS 1 OF MULTIPLE GESTATION: ICD-10-CM

## 2020-03-23 DIAGNOSIS — O09.93 ENCOUNTER FOR SUPERVISION OF HIGH RISK PREGNANCY IN THIRD TRIMESTER, ANTEPARTUM: Primary | ICD-10-CM

## 2020-03-23 LAB
NST ACOUSTIC STIMULATION: NORMAL
NST ACTION NECESSARY: NORMAL
NST ASSESSMENT: REACTIVE
NST BASELINE: 145
NST INDICATIONS: NORMAL
NST OTHER DATA: NORMAL
NST READ BY: NORMAL
NST RETURN: NORMAL
NST UTERINE ACTIVITY: NORMAL

## 2020-03-23 PROCEDURE — 90040 PR PRENATAL FOLLOW UP: CPT | Performed by: NURSE PRACTITIONER

## 2020-03-23 PROCEDURE — 59025 FETAL NON-STRESS TEST: CPT | Performed by: NURSE PRACTITIONER

## 2020-03-23 PROCEDURE — 59425 ANTEPARTUM CARE ONLY: CPT | Performed by: NURSE PRACTITIONER

## 2020-03-23 ASSESSMENT — FIBROSIS 4 INDEX: FIB4 SCORE: 0.59

## 2020-03-23 NOTE — PROGRESS NOTES
Pt is a 30 y.o.  at 38w5d with  Estimated Date of Delivery: 20 who presents for scheduled NST.      NST reactive per my read:    Indication for: decreased fetal movment  FHR baseline: 145  Variability: moderate  Accelerations: +  Decelerations: -  VAS: none  TOCO: no CTX noted

## 2020-03-23 NOTE — PROGRESS NOTES
S:  Pt is  at 38w5d here for routine OB follow up.  Reports increased pressure. Pt declines stripping of membranes.  Reports good FM.  Denies VB, LOF, RUCs, or vaginal DC.     O:  Please see above vitals.        FHTs: 145        Fundal ht: 38 cm        Fetal position: vertex        SVE: /2        GBS negative  -- reviewed w pt.      A:  IUP at 38w5d  Patient Active Problem List    Diagnosis Date Noted   • Elevated glucose tolerance test 2020   • Supervision of other normal pregnancy 2020       P:  1.  Anticipatory guidance given         2.  Labor precautions given.  Instructions given on where to go.  Pt receptive to education.         3.  Plan for VE and IOL referral at next visit.        4.  Questions answered.         5.  Encouraged adequate water intake, walking 30-60 min daily, pelvic rocking, birthing ball       6.  F/u 1wk       7.  NST today.

## 2020-03-23 NOTE — PROGRESS NOTES
Pt here today for OB follow up  Pt states she is having pelvic pressure   Reports +FM, pt states does not do DANIEL everyday. Not sure if baby is meeting kicks.     Good # [phone]   Pharmacy Confirmed.  Chaperone offered and not indicated.  GBS negative.

## 2020-03-27 ENCOUNTER — HOSPITAL ENCOUNTER (INPATIENT)
Facility: MEDICAL CENTER | Age: 30
LOS: 1 days | End: 2020-03-28
Attending: OBSTETRICS & GYNECOLOGY | Admitting: OBSTETRICS & GYNECOLOGY
Payer: MEDICAID

## 2020-03-27 LAB
BASOPHILS # BLD AUTO: 0.8 % (ref 0–1.8)
BASOPHILS # BLD: 0.08 K/UL (ref 0–0.12)
EOSINOPHIL # BLD AUTO: 0.07 K/UL (ref 0–0.51)
EOSINOPHIL NFR BLD: 0.7 % (ref 0–6.9)
ERYTHROCYTE [DISTWIDTH] IN BLOOD BY AUTOMATED COUNT: 44.5 FL (ref 35.9–50)
ERYTHROCYTE [DISTWIDTH] IN BLOOD BY AUTOMATED COUNT: 49.3 FL (ref 35.9–50)
HCT VFR BLD AUTO: 29.1 % (ref 37–47)
HCT VFR BLD AUTO: 39.8 % (ref 37–47)
HGB BLD-MCNC: 12.3 G/DL (ref 12–16)
HGB BLD-MCNC: 9.9 G/DL (ref 12–16)
HOLDING TUBE BB 8507: NORMAL
IMM GRANULOCYTES # BLD AUTO: 0.13 K/UL (ref 0–0.11)
IMM GRANULOCYTES NFR BLD AUTO: 1.3 % (ref 0–0.9)
LYMPHOCYTES # BLD AUTO: 1.91 K/UL (ref 1–4.8)
LYMPHOCYTES NFR BLD: 19 % (ref 22–41)
MCH RBC QN AUTO: 28.9 PG (ref 27–33)
MCH RBC QN AUTO: 29.6 PG (ref 27–33)
MCHC RBC AUTO-ENTMCNC: 30.9 G/DL (ref 33.6–35)
MCHC RBC AUTO-ENTMCNC: 34 G/DL (ref 33.6–35)
MCV RBC AUTO: 86.9 FL (ref 81.4–97.8)
MCV RBC AUTO: 93.4 FL (ref 81.4–97.8)
MONOCYTES # BLD AUTO: 0.69 K/UL (ref 0–0.85)
MONOCYTES NFR BLD AUTO: 6.9 % (ref 0–13.4)
NEUTROPHILS # BLD AUTO: 7.17 K/UL (ref 2–7.15)
NEUTROPHILS NFR BLD: 71.3 % (ref 44–72)
NRBC # BLD AUTO: 0 K/UL
NRBC BLD-RTO: 0 /100 WBC
PLATELET # BLD AUTO: 165 K/UL (ref 164–446)
PLATELET # BLD AUTO: 173 K/UL (ref 164–446)
PMV BLD AUTO: 11.4 FL (ref 9–12.9)
PMV BLD AUTO: 12.3 FL (ref 9–12.9)
RBC # BLD AUTO: 3.35 M/UL (ref 4.2–5.4)
RBC # BLD AUTO: 4.26 M/UL (ref 4.2–5.4)
WBC # BLD AUTO: 10.3 K/UL (ref 4.8–10.8)
WBC # BLD AUTO: 14.8 K/UL (ref 4.8–10.8)

## 2020-03-27 PROCEDURE — 59410 OBSTETRICAL CARE: CPT | Performed by: OBSTETRICS & GYNECOLOGY

## 2020-03-27 PROCEDURE — 36415 COLL VENOUS BLD VENIPUNCTURE: CPT

## 2020-03-27 PROCEDURE — 700111 HCHG RX REV CODE 636 W/ 250 OVERRIDE (IP): Performed by: NURSE PRACTITIONER

## 2020-03-27 PROCEDURE — 304965 HCHG RECOVERY SERVICES

## 2020-03-27 PROCEDURE — 85025 COMPLETE CBC W/AUTO DIFF WBC: CPT

## 2020-03-27 PROCEDURE — 700101 HCHG RX REV CODE 250

## 2020-03-27 PROCEDURE — 59409 OBSTETRICAL CARE: CPT

## 2020-03-27 PROCEDURE — 770002 HCHG ROOM/CARE - OB PRIVATE (112)

## 2020-03-27 PROCEDURE — 700102 HCHG RX REV CODE 250 W/ 637 OVERRIDE(OP): Performed by: NURSE PRACTITIONER

## 2020-03-27 PROCEDURE — A9270 NON-COVERED ITEM OR SERVICE: HCPCS | Performed by: NURSE PRACTITIONER

## 2020-03-27 PROCEDURE — A9270 NON-COVERED ITEM OR SERVICE: HCPCS | Performed by: STUDENT IN AN ORGANIZED HEALTH CARE EDUCATION/TRAINING PROGRAM

## 2020-03-27 PROCEDURE — 0KQM0ZZ REPAIR PERINEUM MUSCLE, OPEN APPROACH: ICD-10-PCS | Performed by: OBSTETRICS & GYNECOLOGY

## 2020-03-27 PROCEDURE — 700111 HCHG RX REV CODE 636 W/ 250 OVERRIDE (IP): Performed by: STUDENT IN AN ORGANIZED HEALTH CARE EDUCATION/TRAINING PROGRAM

## 2020-03-27 PROCEDURE — 700102 HCHG RX REV CODE 250 W/ 637 OVERRIDE(OP): Performed by: STUDENT IN AN ORGANIZED HEALTH CARE EDUCATION/TRAINING PROGRAM

## 2020-03-27 PROCEDURE — 85027 COMPLETE CBC AUTOMATED: CPT

## 2020-03-27 PROCEDURE — 700105 HCHG RX REV CODE 258: Performed by: NURSE PRACTITIONER

## 2020-03-27 RX ORDER — MISOPROSTOL 200 UG/1
800 TABLET ORAL
Status: DISCONTINUED | OUTPATIENT
Start: 2020-03-27 | End: 2020-03-27 | Stop reason: HOSPADM

## 2020-03-27 RX ORDER — SODIUM CHLORIDE, SODIUM LACTATE, POTASSIUM CHLORIDE, CALCIUM CHLORIDE 600; 310; 30; 20 MG/100ML; MG/100ML; MG/100ML; MG/100ML
INJECTION, SOLUTION INTRAVENOUS CONTINUOUS
Status: DISPENSED | OUTPATIENT
Start: 2020-03-27 | End: 2020-03-27

## 2020-03-27 RX ORDER — HYDROCODONE BITARTRATE AND ACETAMINOPHEN 10; 325 MG/1; MG/1
1 TABLET ORAL EVERY 4 HOURS PRN
Status: DISCONTINUED | OUTPATIENT
Start: 2020-03-27 | End: 2020-03-28 | Stop reason: HOSPADM

## 2020-03-27 RX ORDER — DOCUSATE SODIUM 100 MG/1
100 CAPSULE, LIQUID FILLED ORAL 2 TIMES DAILY PRN
Status: DISCONTINUED | OUTPATIENT
Start: 2020-03-27 | End: 2020-03-28 | Stop reason: HOSPADM

## 2020-03-27 RX ORDER — DEXTROSE, SODIUM CHLORIDE, SODIUM LACTATE, POTASSIUM CHLORIDE, AND CALCIUM CHLORIDE 5; .6; .31; .03; .02 G/100ML; G/100ML; G/100ML; G/100ML; G/100ML
INJECTION, SOLUTION INTRAVENOUS CONTINUOUS
Status: DISCONTINUED | OUTPATIENT
Start: 2020-03-27 | End: 2020-03-28 | Stop reason: HOSPADM

## 2020-03-27 RX ORDER — OXYTOCIN 10 [USP'U]/ML
10 INJECTION, SOLUTION INTRAMUSCULAR; INTRAVENOUS
Status: DISCONTINUED | OUTPATIENT
Start: 2020-03-27 | End: 2020-03-27 | Stop reason: HOSPADM

## 2020-03-27 RX ORDER — IBUPROFEN 800 MG/1
800 TABLET ORAL EVERY 8 HOURS PRN
Status: DISCONTINUED | OUTPATIENT
Start: 2020-03-27 | End: 2020-03-27

## 2020-03-27 RX ORDER — CITRIC ACID/SODIUM CITRATE 334-500MG
30 SOLUTION, ORAL ORAL EVERY 6 HOURS PRN
Status: DISCONTINUED | OUTPATIENT
Start: 2020-03-27 | End: 2020-03-27 | Stop reason: HOSPADM

## 2020-03-27 RX ORDER — MISOPROSTOL 200 UG/1
600 TABLET ORAL
Status: DISCONTINUED | OUTPATIENT
Start: 2020-03-27 | End: 2020-03-28 | Stop reason: HOSPADM

## 2020-03-27 RX ORDER — IBUPROFEN 800 MG/1
800 TABLET ORAL 3 TIMES DAILY PRN
Status: DISCONTINUED | OUTPATIENT
Start: 2020-03-27 | End: 2020-03-28 | Stop reason: HOSPADM

## 2020-03-27 RX ORDER — VITAMIN A ACETATE, BETA CAROTENE, ASCORBIC ACID, CHOLECALCIFEROL, .ALPHA.-TOCOPHEROL ACETATE, DL-, THIAMINE MONONITRATE, RIBOFLAVIN, NIACINAMIDE, PYRIDOXINE HYDROCHLORIDE, FOLIC ACID, CYANOCOBALAMIN, CALCIUM CARBONATE, FERROUS FUMARATE, ZINC OXIDE, CUPRIC OXIDE 3080; 12; 120; 400; 1; 1.84; 3; 20; 22; 920; 25; 200; 27; 10; 2 [IU]/1; UG/1; MG/1; [IU]/1; MG/1; MG/1; MG/1; MG/1; MG/1; [IU]/1; MG/1; MG/1; MG/1; MG/1; MG/1
1 TABLET, FILM COATED ORAL EVERY MORNING
Status: DISCONTINUED | OUTPATIENT
Start: 2020-03-27 | End: 2020-03-28 | Stop reason: HOSPADM

## 2020-03-27 RX ORDER — ACETAMINOPHEN 325 MG/1
650 TABLET ORAL EVERY 4 HOURS PRN
Status: DISCONTINUED | OUTPATIENT
Start: 2020-03-27 | End: 2020-03-28 | Stop reason: HOSPADM

## 2020-03-27 RX ORDER — HYDROCODONE BITARTRATE AND ACETAMINOPHEN 5; 325 MG/1; MG/1
1 TABLET ORAL EVERY 4 HOURS PRN
Status: DISCONTINUED | OUTPATIENT
Start: 2020-03-27 | End: 2020-03-28 | Stop reason: HOSPADM

## 2020-03-27 RX ORDER — ACETAMINOPHEN 325 MG/1
975 TABLET ORAL EVERY 6 HOURS PRN
Status: DISCONTINUED | OUTPATIENT
Start: 2020-03-27 | End: 2020-03-27

## 2020-03-27 RX ORDER — CARBOPROST TROMETHAMINE 250 UG/ML
250 INJECTION, SOLUTION INTRAMUSCULAR
Status: DISCONTINUED | OUTPATIENT
Start: 2020-03-27 | End: 2020-03-27 | Stop reason: HOSPADM

## 2020-03-27 RX ORDER — METHYLERGONOVINE MALEATE 0.2 MG/ML
0.2 INJECTION INTRAVENOUS
Status: DISCONTINUED | OUTPATIENT
Start: 2020-03-27 | End: 2020-03-27 | Stop reason: HOSPADM

## 2020-03-27 RX ORDER — SODIUM CHLORIDE, SODIUM LACTATE, POTASSIUM CHLORIDE, CALCIUM CHLORIDE 600; 310; 30; 20 MG/100ML; MG/100ML; MG/100ML; MG/100ML
INJECTION, SOLUTION INTRAVENOUS PRN
Status: DISCONTINUED | OUTPATIENT
Start: 2020-03-27 | End: 2020-03-28 | Stop reason: HOSPADM

## 2020-03-27 RX ORDER — LIDOCAINE HYDROCHLORIDE 10 MG/ML
INJECTION, SOLUTION INFILTRATION; PERINEURAL
Status: COMPLETED
Start: 2020-03-27 | End: 2020-03-27

## 2020-03-27 RX ORDER — METOCLOPRAMIDE HYDROCHLORIDE 5 MG/ML
10 INJECTION INTRAMUSCULAR; INTRAVENOUS EVERY 6 HOURS PRN
Status: DISCONTINUED | OUTPATIENT
Start: 2020-03-27 | End: 2020-03-28 | Stop reason: HOSPADM

## 2020-03-27 RX ADMIN — OXYTOCIN 41.67 MILLI-UNITS/MIN: 10 INJECTION, SOLUTION INTRAMUSCULAR; INTRAVENOUS at 12:40

## 2020-03-27 RX ADMIN — OXYTOCIN 1 MILLI-UNITS/MIN: 10 INJECTION, SOLUTION INTRAMUSCULAR; INTRAVENOUS at 08:00

## 2020-03-27 RX ADMIN — IBUPROFEN 800 MG: 800 TABLET, FILM COATED ORAL at 14:14

## 2020-03-27 RX ADMIN — FENTANYL CITRATE 100 MCG: 50 INJECTION, SOLUTION INTRAMUSCULAR; INTRAVENOUS at 09:45

## 2020-03-27 RX ADMIN — IBUPROFEN 800 MG: 800 TABLET, FILM COATED ORAL at 23:52

## 2020-03-27 RX ADMIN — ACETAMINOPHEN 650 MG: 325 TABLET, FILM COATED ORAL at 17:31

## 2020-03-27 RX ADMIN — SODIUM CHLORIDE, POTASSIUM CHLORIDE, SODIUM LACTATE AND CALCIUM CHLORIDE: 600; 310; 30; 20 INJECTION, SOLUTION INTRAVENOUS at 07:30

## 2020-03-27 RX ADMIN — LIDOCAINE HYDROCHLORIDE: 10 INJECTION, SOLUTION INFILTRATION; PERINEURAL at 11:15

## 2020-03-27 RX ADMIN — FENTANYL CITRATE 100 MCG: 50 INJECTION, SOLUTION INTRAMUSCULAR; INTRAVENOUS at 11:25

## 2020-03-27 SDOH — ECONOMIC STABILITY: TRANSPORTATION INSECURITY
IN THE PAST 12 MONTHS, HAS THE LACK OF TRANSPORTATION KEPT YOU FROM MEDICAL APPOINTMENTS OR FROM GETTING MEDICATIONS?: PATIENT DECLINED

## 2020-03-27 SDOH — ECONOMIC STABILITY: TRANSPORTATION INSECURITY
IN THE PAST 12 MONTHS, HAS LACK OF TRANSPORTATION KEPT YOU FROM MEETINGS, WORK, OR FROM GETTING THINGS NEEDED FOR DAILY LIVING?: PATIENT DECLINED

## 2020-03-27 SDOH — ECONOMIC STABILITY: FOOD INSECURITY: WITHIN THE PAST 12 MONTHS, THE FOOD YOU BOUGHT JUST DIDN'T LAST AND YOU DIDN'T HAVE MONEY TO GET MORE.: PATIENT DECLINED

## 2020-03-27 SDOH — ECONOMIC STABILITY: FOOD INSECURITY: WITHIN THE PAST 12 MONTHS, YOU WORRIED THAT YOUR FOOD WOULD RUN OUT BEFORE YOU GOT MONEY TO BUY MORE.: PATIENT DECLINED

## 2020-03-27 ASSESSMENT — LIFESTYLE VARIABLES: EVER_SMOKED: NEVER

## 2020-03-27 ASSESSMENT — FIBROSIS 4 INDEX: FIB4 SCORE: 0.59

## 2020-03-27 NOTE — PROGRESS NOTES
0455 29y/o  edc 2020, EGA 39/ , Here to l&d room S218 with FOB. C/O vaginal bleeding. EFM/TOCO applied, patients states positive fetal movement. SVE 5/80/-2, moderate bleeding noted, no tears or lacerations. MILAGRO Limon CNM updated and coming to bedside.  0615 MILAGRO Limon at patients bedside, attempted to AROM, prolonged decel, patient repositioned, O2 provided.   0700 report to BARRETT Jewell RN

## 2020-03-27 NOTE — PROGRESS NOTES
Pt received from l&d via w/c to room S-321.  Report received at bedside, assumed care of patient.  Pt alert and oriented, showing no s/s of distress.  Rates pain at 7/10 at this time.  Will medicate per orders.  Pt assessment complete, wnl.  Pt voided in l&d.  Instructed to call for assist for first time oob.  FOB at bedside and supportive.  Pt oriented to room, routine, security, and call/emergency light.  POC discussed.  Pt encouraged to call with needs, call light in reach.

## 2020-03-27 NOTE — CARE PLAN
Problem: Knowledge Deficit  Goal: Patient/Support person demonstrates understanding regarding the progression of labor, available options and participates in decision-making process  Outcome: MET   POC discussed and pt states understanding at this time, has no further questions.   Problem: Pain  Goal: Alleviation of Pain or a reduction in pain to the patient's comfort goal  Outcome: MET   Pt has no report of pain at this time and does not desire any pain medications, pt will notify RN if something changes.

## 2020-03-27 NOTE — H&P
History and Physical      Lakia Rosario is a 30 y.o. year old female  at 39w2d who presents for irreg CTXs and bleeding following intercourse. She has had an uncomplicated pregnancy. Her 1 hr GTT was elevated with a normal 3 hr GTT.  Hx of vaginal delivery in .    Subjective:   positive  For CTXS.   positive Feels pain   negative for LOF  positive for vaginal bleeding.   positive for fetal movement    ROS: Pertinent items are noted in HPI.    Past Medical History:   Diagnosis Date   • Pregnancy     states is 6 weeks.     No past surgical history on file.  OB History    Para Term  AB Living   3 1 1 0 1 1   SAB TAB Ectopic Molar Multiple Live Births   1 0 0 0   1      # Outcome Date GA Lbr Victorino/2nd Weight Sex Delivery Anes PTL Lv   3 Current            2 Term 04/03/15 38w0d  3.175 kg (7 lb) M Vag-Spont None  JEAN-CLAUDE   1 SAB 11 17w6d   M    FD     Social History     Socioeconomic History   • Marital status: Single     Spouse name: Not on file   • Number of children: 1   • Years of education: Not on file   • Highest education level: Not on file   Occupational History   • Not on file   Social Needs   • Financial resource strain: Not on file   • Food insecurity     Worry: Not on file     Inability: Not on file   • Transportation needs     Medical: Not on file     Non-medical: Not on file   Tobacco Use   • Smoking status: Never Smoker   • Smokeless tobacco: Never Used   Substance and Sexual Activity   • Alcohol use: No     Frequency: Never   • Drug use: No   • Sexual activity: Yes     Partners: Male     Comment: NONE   Lifestyle   • Physical activity     Days per week: Not on file     Minutes per session: Not on file   • Stress: Not on file   Relationships   • Social connections     Talks on phone: Not on file     Gets together: Not on file     Attends Muslim service: Not on file     Active member of club or organization: Not on file     Attends meetings of clubs or organizations: Not  "on file     Relationship status: Not on file   • Intimate partner violence     Fear of current or ex partner: Not on file     Emotionally abused: Not on file     Physically abused: Not on file     Forced sexual activity: Not on file   Other Topics Concern   • Not on file   Social History Narrative    ** Merged History Encounter **         ** Merged History Encounter **          Allergies: Nkda [no known drug allergy]    Current Facility-Administered Medications:   •  LR infusion, , Intravenous, Continuous, Mirela Limon, A.P.R.N.  •  D5LR infusion, , Intravenous, Continuous, Mirela Limon, A.P.R.N.  •  fentaNYL (SUBLIMAZE) injection 50 mcg, 50 mcg, Intravenous, Q HOUR PRN, Mirela Limon, A.P.R.N.  •  fentaNYL (SUBLIMAZE) injection 100 mcg, 100 mcg, Intravenous, Q HOUR PRN, Mirela Limon, A.P.R.N.  •  Na citrate-citric acid (BICITRA) 500-334 MG/5ML solution 30 mL, 30 mL, Oral, Q6HRS PRN, Mirela Limon, A.P.R.N.  •  oxytocin (PITOCIN) injection 10 Units, 10 Units, Intramuscular, Once PRN, Mirela Limon, A.P.R.N.  •  miSOPROStol (CYTOTEC) tablet 800 mcg, 800 mcg, Rectal, Once PRN, Mirela Limon, A.P.R.N.  •  methylergonovine (METHERGINE) injection 0.2 mg, 0.2 mg, Intramuscular, Once PRN, Mirela Limon, A.P.R.N.  •  carboPROST (HEMABATE) injection 250 mcg, 250 mcg, Intramuscular, Once PRN, Mirela Limon, A.P.R.N.    Prenatal care with [facility] starting at 24w4d with total of 9 visits with following problems:  Patient Active Problem List    Diagnosis Date Noted   • Elevated glucose tolerance test 03/20/2020   • Supervision of other normal pregnancy 01/28/2020         Objective:      /69   Pulse 76   Temp 36.5 °C (97.7 °F) (Temporal)   Resp 18   Ht 1.6 m (5' 2.99\")   Wt 72.9 kg (160 lb 12.4 oz)   SpO2 98%     General:   no acute distress   Skin:   normal   HEENT:  PERRLA   Lungs:   CTA bilateral   Heart:   S1, S2 normal, no murmur, click, rub or gallop, regular rate and rhythm, brisk carotid upstroke " without bruits, peripheral pulses very brisk, chest is clear without rales or wheezing, no pedal edema, no JVD, no hepatosplenomegaly   Abdomen:   gravid, NT   EFW:  5024-7398 g   Pelvis:  Exam deferred., proven to 3175 g   FHTs: 145 BPM + accels - decels moderate variability   Contractions: 3 contractions in 10 min moderate to palpation   Uterine Size: S=D   Presentations: Cephalic per RN   Cervix: Per RN    Dilation: 5cm    Effacement: 70%    Station:  -1    Consistency: Soft    Position: Middle     Complete OB US  Done through [facility]      Lab Review  Lab:   Blood type: O     Recent Results (from the past 5880 hour(s))   DIAGNOSTIC ALCOHOL    Collection Time: 12/15/19  1:01 PM   Result Value Ref Range    Diagnostic Alcohol 0.00 0.00 g/dL   CBC WITHOUT DIFFERENTIAL    Collection Time: 12/15/19  1:01 PM   Result Value Ref Range    WBC 9.6 4.8 - 10.8 K/uL    RBC 3.96 (L) 4.20 - 5.40 M/uL    Hemoglobin 11.8 (L) 12.0 - 16.0 g/dL    Hematocrit 35.9 (L) 37.0 - 47.0 %    MCV 90.7 81.4 - 97.8 fL    MCH 29.8 27.0 - 33.0 pg    MCHC 32.9 (L) 33.6 - 35.0 g/dL    RDW 41.1 35.9 - 50.0 fL    Platelet Count 248 164 - 446 K/uL    MPV 10.7 9.0 - 12.9 fL   Comp Metabolic Panel    Collection Time: 12/15/19  1:01 PM   Result Value Ref Range    Sodium 137 135 - 145 mmol/L    Potassium 3.4 (L) 3.6 - 5.5 mmol/L    Chloride 111 96 - 112 mmol/L    Co2 20 20 - 33 mmol/L    Anion Gap 6.0 0.0 - 11.9    Glucose 94 65 - 99 mg/dL    Bun 11 8 - 22 mg/dL    Creatinine 0.45 (L) 0.50 - 1.40 mg/dL    Calcium 8.6 8.5 - 10.5 mg/dL    AST(SGOT) 15 12 - 45 U/L    ALT(SGPT) 12 2 - 50 U/L    Alkaline Phosphatase 57 30 - 99 U/L    Total Bilirubin 0.5 0.1 - 1.5 mg/dL    Albumin 3.4 3.2 - 4.9 g/dL    Total Protein 6.8 6.0 - 8.2 g/dL    Globulin 3.4 1.9 - 3.5 g/dL    A-G Ratio 1.0 g/dL   Prothrombin Time    Collection Time: 12/15/19  1:01 PM   Result Value Ref Range    PT 12.7 12.0 - 14.6 sec    INR 0.93 0.87 - 1.13   APTT    Collection Time: 12/15/19   1:01 PM   Result Value Ref Range    APTT 25.8 24.7 - 36.0 sec   HCG QUAL SERUM    Collection Time: 12/15/19  1:01 PM   Result Value Ref Range    Beta-Hcg Qualitative Serum Positive (A) Negative   ESTIMATED GFR    Collection Time: 12/15/19  1:01 PM   Result Value Ref Range    GFR If African American >60 >60 mL/min/1.73 m 2    GFR If Non African American >60 >60 mL/min/1.73 m 2   FETAL HGB CALCULATION    Collection Time: 12/15/19  1:48 PM   Result Value Ref Range    Fetal Stain - FRBC 0 FETAL RBC    Adult RBCs Counted 4950 ADULT RBC    Fetal RBC Percent 0.00 %    Number Of Rh Doses Indicated Not Indicated # RhIg   HEMOGLOBIN F STAIN (KLEIHAUER-BETKE STAIN)    Collection Time: 12/15/19  1:57 PM   Result Value Ref Range    Rh With Weak D POS     Number Of Rh Doses Indicated ZERO    CBC WITHOUT DIFFERENTIAL    Collection Time: 02/11/20  7:01 AM   Result Value Ref Range    WBC 10.1 3.4 - 10.8 x10E3/uL    RBC 3.76 (L) 3.77 - 5.28 x10E6/uL    Hemoglobin 11.0 (L) 11.1 - 15.9 g/dL    Hematocrit 33.5 (L) 34.0 - 46.6 %    MCV 89 79 - 97 fL    MCH 29.3 26.6 - 33.0 pg    MCHC 32.8 31.5 - 35.7 g/dL    RDW 13.8 11.7 - 15.4 %    Platelet Count 212 150 - 450 x10E3/uL   TREPONEMA PALLIDUM ANTIBODIES    Collection Time: 02/11/20  7:01 AM   Result Value Ref Range    T. Pallidum Ab Non Reactive Non Reactive   GEST. DIABETES 1-HR SCREEN    Collection Time: 02/11/20  7:01 AM   Result Value Ref Range    Glucose, Post Dose 161 (H) 65 - 139 mg/dL   URINALYSIS    Collection Time: 02/21/20 10:25 AM   Result Value Ref Range    Color Lt. Red     Character Turbid (A)     Specific Gravity 1.021 <1.035    Ph 6.0 5.0 - 8.0    Glucose Negative Negative mg/dL    Ketones Trace (A) Negative mg/dL    Protein 30 (A) Negative mg/dL    Bilirubin Negative Negative    Urobilinogen, Urine 1.0 Negative    Nitrite Negative Negative    Leukocyte Esterase Moderate (A) Negative    Occult Blood Large (A) Negative    Micro Urine Req Microscopic    URINE MICROSCOPIC  (W/UA)    Collection Time: 20 10:25 AM   Result Value Ref Range    WBC 5-10 (A) /hpf    RBC >150 (A) /hpf    Bacteria Negative None /hpf    Epithelial Cells Few /hpf    Hyaline Cast 0-2 /lpf   POC UA    Collection Time: 20 10:31 AM   Result Value Ref Range    POC Color Red (A)     POC Appearance Turbid (A)     POC Glucose Negative Negative mg/dL    POC Ketones 15 (A) Negative mg/dL    POC Specific Gravity 1.025 1.005 - 1.030    POC Blood Large (A) Negative    POC Urine PH 6.0 5.0 - 8.0    POC Protein 30 (A) Negative mg/dL    POC Nitrites Negative Negative    POC Leukocyte Esterase Trace (A) Negative   POC UA    Collection Time: 20  1:37 PM   Result Value Ref Range    POC Color Yellow     POC Appearance Slightly Cloudy (A)     POC Glucose 500 (A) Negative mg/dL    POC Ketones 15 (A) Negative mg/dL    POC Specific Gravity 1.025 1.005 - 1.030    POC Blood Large (A) Negative    POC Urine PH 6.0 5.0 - 8.0    POC Protein Trace (A) Negative mg/dL    POC Nitrites Negative Negative    POC Leukocyte Esterase Small (A) Negative   GRP B STREP, BY PCR (BROWN BROTH)    Collection Time: 20  4:32 PM   Result Value Ref Range    Strep Gp B DNA PCR Negative Negative   GLUCOSE TOLERANCE 3 HOUR    Collection Time: 20 12:00 AM   Result Value Ref Range    Glucose 3 Hour 81, 159, 176, 92    GLUCOSE TOLERANCE 3 HOUR    Collection Time: 20 12:00 AM   Result Value Ref Range    Glucose 3 Hour Fastin, 1 hr: 159, 2 hr: 176, 3 hr: 92    POCT Fetal Nonstress Test    Collection Time: 20  2:17 PM   Result Value Ref Range    NST Indications decreased fetal movement     NST Baseline 145     NST Uterine Activity none     NST Acoustic Stimulation none     NST Assessment reactive     NST Action Necessary none     NST Other Data moderate variability     NST Return      NST Read By LISHA Limon         Assessment:   1.  IUP at 39w2d  2.  Labor status: Active phase labor.  3.  Cat 1 FHTs  4.  Obstetrical history  significant for   Patient Active Problem List    Diagnosis Date Noted   • Elevated glucose tolerance test 03/20/2020   • Supervision of other normal pregnancy 01/28/2020   .      Plan:     Admit to L&D  GBS negative  Routine labs  Pain management prn    Mirela Limon CNM, APRN

## 2020-03-27 NOTE — CARE PLAN
Problem: Altered physiologic condition related to immediate post-delivery state and potential for bleeding/hemorrhage  Goal: Patient physiologically stable as evidenced by normal lochia, palpable uterine involution and vital signs within normal limits  Outcome: PROGRESSING AS EXPECTED  Note: Fundus firm, lochia light, VSS.     Problem: Potential for postpartum infection related to presence of episiotomy/vaginal tear and/or uterine contamination  Goal: Patient will be absent from signs and symptoms of infection  Outcome: PROGRESSING AS EXPECTED  Note: Pt afebrile, showing no s/s of infection.

## 2020-03-27 NOTE — PROGRESS NOTES
0700  Report from NOC RN in room with pt.  Pt has no new complaints of pain at this time.  POC discussed and pt states understanding.  0725  Dr. Gonzalez in room, SVE with AROM=clear fluid, no cervical change at this time.  Orders for IV pitocin received at this time.  Pericare performed and pt has no new complaints. 0940  Pt reports feeling some pressure, SVE with mild cervical change made at this time.  Pt requests IV medications, IV pain medications given at this time.  1045  Pt reports more pressure, SVE with complete dilation at this time.  Delivery prep and pt pushing with UC's.  1103  Delivery of viable baby boy, apgars 8/9.  Second degree laceration repaired at this time with local by MD.  IV pain medications given for coverage during repair.  1200  Pt does not have urgency to void, pt understands to notify RN if she gets the feeling and not to get up on her own.  1240  Pt up to BR, voided and pericare performed at this time.  Pt does not desire any pain medications at this time.  Pt into W/C transferred to PP and report to PP RN.

## 2020-03-28 VITALS
DIASTOLIC BLOOD PRESSURE: 62 MMHG | RESPIRATION RATE: 18 BRPM | WEIGHT: 160.77 LBS | OXYGEN SATURATION: 96 % | BODY MASS INDEX: 28.48 KG/M2 | SYSTOLIC BLOOD PRESSURE: 104 MMHG | TEMPERATURE: 97.6 F | HEIGHT: 63 IN | HEART RATE: 78 BPM

## 2020-03-28 PROCEDURE — A9270 NON-COVERED ITEM OR SERVICE: HCPCS | Performed by: STUDENT IN AN ORGANIZED HEALTH CARE EDUCATION/TRAINING PROGRAM

## 2020-03-28 PROCEDURE — 700102 HCHG RX REV CODE 250 W/ 637 OVERRIDE(OP): Performed by: NURSE PRACTITIONER

## 2020-03-28 PROCEDURE — A9270 NON-COVERED ITEM OR SERVICE: HCPCS | Performed by: NURSE PRACTITIONER

## 2020-03-28 PROCEDURE — 700102 HCHG RX REV CODE 250 W/ 637 OVERRIDE(OP): Performed by: STUDENT IN AN ORGANIZED HEALTH CARE EDUCATION/TRAINING PROGRAM

## 2020-03-28 PROCEDURE — 700112 HCHG RX REV CODE 229: Performed by: STUDENT IN AN ORGANIZED HEALTH CARE EDUCATION/TRAINING PROGRAM

## 2020-03-28 RX ORDER — IBUPROFEN 800 MG/1
800 TABLET ORAL 3 TIMES DAILY PRN
Qty: 30 TAB | Refills: 1 | Status: SHIPPED | OUTPATIENT
Start: 2020-03-28

## 2020-03-28 RX ORDER — VITAMIN A ACETATE, BETA CAROTENE, ASCORBIC ACID, CHOLECALCIFEROL, .ALPHA.-TOCOPHEROL ACETATE, DL-, THIAMINE MONONITRATE, RIBOFLAVIN, NIACINAMIDE, PYRIDOXINE HYDROCHLORIDE, FOLIC ACID, CYANOCOBALAMIN, CALCIUM CARBONATE, FERROUS FUMARATE, ZINC OXIDE, CUPRIC OXIDE 3080; 12; 120; 400; 1; 1.84; 3; 20; 22; 920; 25; 200; 27; 10; 2 [IU]/1; UG/1; MG/1; [IU]/1; MG/1; MG/1; MG/1; MG/1; MG/1; [IU]/1; MG/1; MG/1; MG/1; MG/1; MG/1
1 TABLET, FILM COATED ORAL EVERY MORNING
Qty: 30 TAB | Refills: 1 | Status: SHIPPED | OUTPATIENT
Start: 2020-03-29 | End: 2021-07-21

## 2020-03-28 RX ORDER — FERROUS SULFATE 325(65) MG
325 TABLET ORAL DAILY
Qty: 30 TAB | Refills: 1 | Status: SHIPPED | OUTPATIENT
Start: 2020-03-28

## 2020-03-28 RX ORDER — PSEUDOEPHEDRINE HCL 30 MG
100 TABLET ORAL 2 TIMES DAILY PRN
Qty: 60 CAP | Refills: 1 | Status: SHIPPED | OUTPATIENT
Start: 2020-03-28 | End: 2021-07-21

## 2020-03-28 RX ADMIN — HYDROCODONE BITARTRATE AND ACETAMINOPHEN 1 TABLET: 5; 325 TABLET ORAL at 14:17

## 2020-03-28 RX ADMIN — VITAMIN A, VITAMIN C, VITAMIN D-3, VITAMIN E, VITAMIN B-1, VITAMIN B-2, NIACIN, VITAMIN B-6, CALCIUM, IRON, ZINC, COPPER 1 TABLET: 4000; 120; 400; 22; 1.84; 3; 20; 10; 1; 12; 200; 27; 25; 2 TABLET ORAL at 06:17

## 2020-03-28 RX ADMIN — HYDROCODONE BITARTRATE AND ACETAMINOPHEN 1 TABLET: 10; 325 TABLET ORAL at 02:37

## 2020-03-28 RX ADMIN — HYDROCODONE BITARTRATE AND ACETAMINOPHEN 1 TABLET: 5; 325 TABLET ORAL at 06:17

## 2020-03-28 RX ADMIN — DOCUSATE SODIUM 100 MG: 100 CAPSULE, LIQUID FILLED ORAL at 06:17

## 2020-03-28 ASSESSMENT — EDINBURGH POSTNATAL DEPRESSION SCALE (EPDS)
I HAVE FELT SCARED OR PANICKY FOR NO GOOD REASON: NO, NOT AT ALL
THE THOUGHT OF HARMING MYSELF HAS OCCURRED TO ME: NEVER
I HAVE BEEN ANXIOUS OR WORRIED FOR NO GOOD REASON: NO, NOT AT ALL
I HAVE LOOKED FORWARD WITH ENJOYMENT TO THINGS: AS MUCH AS I EVER DID
I HAVE BEEN SO UNHAPPY THAT I HAVE BEEN CRYING: NO, NEVER
I HAVE BLAMED MYSELF UNNECESSARILY WHEN THINGS WENT WRONG: NO, NEVER
I HAVE BEEN ABLE TO LAUGH AND SEE THE FUNNY SIDE OF THINGS: AS MUCH AS I ALWAYS COULD
I HAVE BEEN SO UNHAPPY THAT I HAVE HAD DIFFICULTY SLEEPING: NOT AT ALL
I HAVE FELT SAD OR MISERABLE: NO, NOT AT ALL
THINGS HAVE BEEN GETTING ON TOP OF ME: NO, I HAVE BEEN COPING AS WELL AS EVER

## 2020-03-28 NOTE — CARE PLAN
Problem: Altered physiologic condition related to immediate post-delivery state and potential for bleeding/hemorrhage  Goal: Patient physiologically stable as evidenced by normal lochia, palpable uterine involution and vital signs within normal limits  Outcome: PROGRESSING AS EXPECTED  Note: Fundus firm at U, lochia rubra minimal. V/S WNL     Problem: Alteration in comfort related to episiotomy, vaginal repair and/or after birth pains  Goal: Patient verbalizes acceptable pain level  Outcome: PROGRESSING AS EXPECTED  Note: Patient verbalized acceptable pain level at this time. Will be medicated as needed.

## 2020-03-28 NOTE — DISCHARGE SUMMARY
Discharge Summary:      Lakia Rosario      Admit Date:   3/27/2020  Discharge Date:  3/28/2020     Admitting diagnosis:  Pregnancy  Labor and delivery, indication for care  Discharge Diagnosis: Status post vaginal, spontaneous.  Pregnancy Complications: none  Tubal Ligation:  no        History:  Past Medical History:   Diagnosis Date   • Pregnancy     states is 6 weeks.     OB History    Para Term  AB Living   3 2 2 0 1 2   SAB TAB Ectopic Molar Multiple Live Births   1 0 0 0 0 2      # Outcome Date GA Lbr Victorino/2nd Weight Sex Delivery Anes PTL Lv   3 Term 20 39w2d 03:25 / 00:13 3.92 kg (8 lb 10.3 oz) M Vag-Spont Local N JEAN-CLAUDE   2 Term 04/03/15 38w0d  3.175 kg (7 lb) M Vag-Spont None  JEAN-CLAUDE   1 SAB 11 17w6d   M    FD        Nkda [no known drug allergy]  Patient Active Problem List    Diagnosis Date Noted   • Elevated glucose tolerance test 2020   • Supervision of other normal pregnancy 2020        Hospital Course:   30 y.o. , now para 2, was admitted with the above mentioned diagnosis, underwent Active Labor, vaginal, spontaneous. Patient postpartum course was unremarkable, with progressive advancement in diet , ambulation and toleration of oral analgesia. Patient without complaints today and desires discharge.      Vitals:    20 1800 20 2200 20 0200 20 0558   BP: 102/53 (!) 96/52 (!) 94/67 104/62   Pulse: 73 88 87 78   Resp: 20 18 18 18   Temp: 36.5 °C (97.7 °F) 36.2 °C (97.1 °F) 36.4 °C (97.6 °F) 36.4 °C (97.6 °F)   TempSrc: Temporal Temporal Temporal Temporal   SpO2: 95% 96% 97% 96%   Weight:       Height:           Current Facility-Administered Medications   Medication Dose   • D5LR infusion     • metoclopramide (REGLAN) injection 10 mg  10 mg   • oxytocin (PITOCIN) infusion (for postpartum)  2,000 mL/hr    Followed by   • oxytocin (PITOCIN) infusion (for postpartum)   mL/hr   • HYDROcodone-acetaminophen (NORCO) 5-325 MG per tablet 1  Tab  1 Tab   • HYDROcodone/acetaminophen (NORCO)  MG per tablet 1 Tab  1 Tab   • oxytocin (PITOCIN) 20 UNITS/1000ML LR (induction of labor)  0.5-20 marta-units/min   • LR infusion     • miSOPROStol (CYTOTEC) tablet 600 mcg  600 mcg   • PRN oxytocin (PITOCIN) (20 Units/1000 mL) PRN for excessive uterine bleeding - See Admin Instr  125-999 mL/hr   • docusate sodium (COLACE) capsule 100 mg  100 mg   • prenatal plus vitamin (STUARTNATAL 1+1) 27-1 MG tablet 1 Tab  1 Tab   • acetaminophen (TYLENOL) tablet 650 mg  650 mg   • ibuprofen (MOTRIN) tablet 800 mg  800 mg       Exam:  Breast Exam: negative  Abdomen: Abdomen soft, non-tender. BS normal. No masses,  No organomegaly  Fundus Non Tender: yes  Incision: none  Perineum: perineum intact  Extremity: extremities, peripheral pulses and reflexes normal     Labs:  Recent Labs     03/27/20 0525 03/27/20 2000   WBC 10.3 14.8*   RBC 4.26 3.35*   HEMOGLOBIN 12.3 9.9*   HEMATOCRIT 39.8 29.1*   MCV 93.4 86.9   MCH 28.9 29.6   MCHC 30.9* 34.0   RDW 49.3 44.5   PLATELETCT 173 165   MPV 11.4 12.3        Activity:   Discharge to home  Pelvic Rest x 6 weeks    Assessment:  normal postpartum course  Discharge Assessment: Taking adequate diet and fluids, No heavy bleeding or foul vaginal discharge , Voiding without difficulty    Plans for contraception: none as of yet, possibly the Nexplanon     Follow up: [facility] or [facility] in 5 weeks for vaginal; 1 week for incision check.   To resume daily PNV and iron supplement if needed with hydration.   Patient to RT [facility] or ER if any of the following occur:  Fever over 100.5  Severe abdominal pain  Red streaks or painful masses in the breasts  Foul smelling discharge or lochia  Heavy vaginal bleeding saturating a pad per hour  S/s of PP depression     Discharge Meds:   No current outpatient medications on file.       Krystyna Cerda M.D.

## 2020-03-28 NOTE — LACTATION NOTE
Mother states baby has been breastfeeding well, parents state baby has voided and stooled, she denies pain when she breastfeeds, she declines offer for assistance at this time, educated on expected feeding frequency and duration, educated on expectations regarding milk supply and infant intake when breastfeeding, educated on [id number], encouraged frequent [id number] and especially when breastfeeding, encouraged ad tamica breastfeeding at least Q 3 hours    Mother asked if it's ok to both breast and formula feed, assured her that at this time there is no medical indication for supplementation, also assured her that we support however she chooses to feed her baby, educated on potential impact of supplementation on maternal milk supply as well as infant breastfeeding, if she chooses to supplement encouraged to only offer small volumes of supplement after first breastfeeding    Mother states she has already been given written information on outpatient breastfeeding assistance available after discharge, encouraged to call for assistance if needed    Encouraged to call for assistance as needed

## 2020-03-28 NOTE — PROGRESS NOTES
Discharge instructions reviewed, verbalized understanding, papers signed. Prescribed med information given, reviewed, verbalized understanding.

## 2020-03-28 NOTE — DISCHARGE INSTRUCTIONS
POSTPARTUM DISCHARGE INSTRUCTIONS FOR MOM    YOB: 1990   Age: 30 y.o.               Admit Date: 3/27/2020     Discharge Date: 3/28/2020  Attending Doctor:  Frank Gonzalez M.D.                  Allergies:  Nkda [no known drug allergy]    Discharged to home by car. Discharged via wheelchair, hospital escort: Yes.  Special equipment needed: Not Applicable  Belongings with: Personal  Be sure to schedule a follow-up appointment with your primary care doctor or any specialists as instructed.     Discharge Plan:   Diet Plan: Discussed  Activity Level: Discussed  Confirmed Follow up Appointment: Patient to Call and Schedule Appointment  Confirmed Symptoms Management: Discussed  Medication Reconciliation Updated: Yes  Influenza Vaccine Indication: Not indicated: Previously immunized this influenza season and > 8 years of age    REASONS TO CALL YOUR OBSTETRICIAN:  1.   Persistent fever or shaking chills (Temperature higher than 100.4)  2.   Heavy bleeding (soaking more than 1 pad per hour); Passing clots  3.   Foul odor from vagina  4.   Mastitis (Breast infection; breast pain, chills, fever, redness)  5.   Urinary pain, burning or frequency  6.   Episiotomy infection  7.   Abdominal incision infection  8.   Severe depression longer than 24 hours    HAND WASHING  · Prior to handling the baby.  · Before breastfeeding or bottle feeding baby.  · After using the bathroom or changing the baby's diaper.    WOUND CARE  Ask your physician for additional care instructions.  In general:    ·  Incision:      · Keep clean and dry.    · Do NOT lift anything heavier than your baby for up to 6 weeks.    · There should not be any opening or pus.      VAGINAL CARE  · Nothing inside vagina for 6 weeks: no sexual intercourse, tampons or douching.  · Bleeding may continue for 2-4 weeks.  Amount may vary.    · Call your physician for heavy bleeding which means soaking more than 1 pad per hour    BIRTH CONTROL  · It is  "possible to become pregnant at any time after delivery and while breastfeeding.  · Plan to discuss a method of birth control with your physician at your follow up visit. visit.    DIET AND ELIMINATION  · Eating more fiber (bran cereal, fruits, and vegetables) and drinking plenty of fluids will help to avoid constipation.  · Urinary frequency after childbirth is normal.    POSTPARTUM BLUES  During the first few days after birth, you may experience a sense of the \"blues\" which may include impatience, irritability or even crying.  These feeling come and go quickly.  However, as many as 1 in 10 women experience emotional symptoms known as postpartum depression.    Postpartum depression:  May start as early as the second or third day after delivery or take several weeks or months to develop.  Symptoms of \"blues\" are present, but are more intense:  Crying spells; loss of appetite; feelings of hopelessness or loss of control; fear of touching the baby; over concern or no concern at all about the baby; little or no concern about your own appearance/caring for yourself; and/or inability to sleep or excessive sleeping.  Contact your physician if you are experiencing any of these symptoms.    Crisis Hotline:  · [place] Crisis Hotline:  6-980-VRZPMMO  Or 1-721.734.5556  · [place] Crisis Hotline:  1-732.800.6954  Or 642-707-9467    PREVENTING SHAKEN BABY:  If you are angry or stressed, PUT THE BABY IN THE CRIB, step away, take some deep breaths, and wait until you are calm to care for the baby.  DO NOT SHAKE THE BABY.  You are not alone, call a supporter for help.    · Crisis Call Center 24/7 crisis line 143-332-7992 or 1-367.185.6780  · You can also text them, text \"ANSWER\" to 292981    QUIT SMOKING/TOBACCO USE:  I understand the use of any tobacco products increases my chance of suffering from future heart disease and could cause other illnesses which may shorten my life. Quitting the use of tobacco products is the single most " important thing I can do to improve my health. For further information on smoking / tobacco cessation call a Toll Free Quit Line at 1-608.341.4652 (*[employer]) or 1-125.805.4116 (American Lung Association) or you can access the web based program at www.lungusa.org.    · [place] Tobacco Users Help Line:  (329) 425-2557       Toll Free: 1-292.812.6204  · Quit Tobacco Program [facility] (229)205-9817    DEPRESSION / SUICIDE RISK:  As you are discharged from this [facility], it is important to learn how to keep safe from harming yourself.    Recognize the warning signs:  · Abrupt changes in personality, positive or negative- including increase in energy   · Giving away possessions  · Change in eating patterns- significant weight changes-  positive or negative  · Change in sleeping patterns- unable to sleep or sleeping all the time   · Unwillingness or inability to communicate  · Depression  · Unusual sadness, discouragement and loneliness  · Talk of wanting to die  · Neglect of personal appearance   · Rebelliousness- reckless behavior  · Withdrawal from people/activities they love  · Confusion- inability to concentrate     If you or a loved one observes any of these behaviors or has concerns about self-harm, here's what you can do:  · Talk about it- your feelings and reasons for harming yourself  · Remove any means that you might use to hurt yourself (examples: pills, rope, extension cords, firearm)  · Get professional help from the community (Mental Health, Substance Abuse, psychological counseling)  · Do not be alone:Call your Safe Contact- someone whom you trust who will be there for you.  · Call your local CRISIS HOTLINE 872-5169 or 201-963-6054  · Call your local Children's Mobile Crisis Response Team [place] (977) 138-9469 or www.[place]  · Call the toll free National Suicide Prevention Hotlines   · National Suicide Prevention Lifeline 945-984-MQRW  (3590)  · [facility] 800-SUICIDE (780-1993)    DISCHARGE SURVEY:  Thank you for choosing [facility].  We hope we provided you with very good care.  You may be receiving a survey in the mail.  Please fill it out.  Your opinion is valuable to us.    ADDITIONAL EDUCATIONAL MATERIALS GIVEN TO PATIENT: none        My signature on this form indicates that:  1.  I have reviewed and understand the above information  2.  My questions regarding this information have been answered to my satisfaction.  3.  I have formulated a plan with my discharge nurse to obtain my prescribed medication for home.

## 2020-03-28 NOTE — CONSULTS
Lactation note:  Initial visit. Discussed normal  feeding behaviors and normal course of breastfeeding at 12-24-48-72 hours, and what to expect. Discussed importance of offering breast with feeding cues or at least every 2-3 hours, and even if infant shows no interest, can do hand expression into infant's lips. Showed MOB how to hand express colostrum. Multiple drops expressed. Encouraged to continue doing skin to skin. Discussed indicators of an ideal deep latch, voiding and stooling patterns, feeding cues, stomach size, and importance of establishing milk supply with frequency of feedings.  Plan for tonight is to continue to offer breast first, if not latching well, can hand express colostrum, and refeed to infant.    Encouraged her to continue to work on deep latch, and skin 2 skin, with hand expression. Assisted with latch in football hold to right breast. Infant not opening wide and is only latching onto the nipple. Mother is complaining of pain. Relatched, still shallow, but mother reports more comfort. Lips are flanged. Discussed what to expect next day.  Information and phone number to the [facility] & invited to breastfeeding circles. Encouraged to check [employer] website for latest information on when breastfeeding circles are resuming.    MOB has no other questions or concerns regarding breastfeeding. Encouraged to call for assistance as needed.

## 2020-03-28 NOTE — L&D DELIVERY NOTE
Vaginal Delivery Procedure Note:    Lakia Rosario is a 30 y.o. , now Para  admitted for labor.  Her labor course was uncomplicated and she progressed to completion without concerns.      PreDelivery Diagnosis:  1. SIUP @ 39w2d    PostDelivery Diagnosis:  1. S/p       Procedure in Detail:  Pt pushing dorsal lithotomy position.  Head delivered slowly with uncoordinated maternal effort.  With repositioning of patient's legs in Joseph, the head delivered easily in the MARYLOU position.  The right shoulder followed easily with gentle downwards pressure followed by the posterior shoulder and body.  There was a body cord.  Infant was placed on the maternal abdomen and was stimulated and bulb suctioned.  Cord clamping was delayed x30 seconds then the cord was clamped x2 and cut.  Infant APGARs 8 and 9 and 1 and 5 minutes, respectively.  Birth weight 3920g.  Placenta delivered spontaneously intact with 3 Vessel cord.  The vagina and perineum were examined revealing 2nd degree laceration which was repaired in the usual fashio.  The uterus was firm with IV pitocin and fundal massage.  Pt and infant left bonding in LDR.      Anesthesia - IV fentanyl  Sponge and needle counts correct.  Patient tolerated procedure well.    Anticipate routine postparum care.      Chio Hough DO  [facility], Women's Health

## 2020-04-17 ENCOUNTER — TELEPHONE (OUTPATIENT)
Dept: OBGYN | Facility: CLINIC | Age: 30
End: 2020-04-17

## 2020-04-17 NOTE — TELEPHONE ENCOUNTER
Called pt 2 x to let her know her FOB's FMLA forms are done and ready to be picked up (forms nned to be signed by employee(FOB) and unable to mail them) Both times pt answered and hung up the call.

## 2021-07-21 ENCOUNTER — OFFICE VISIT (OUTPATIENT)
Dept: URGENT CARE | Facility: PHYSICIAN GROUP | Age: 31
End: 2021-07-21

## 2021-07-21 VITALS
SYSTOLIC BLOOD PRESSURE: 110 MMHG | HEIGHT: 63 IN | HEART RATE: 73 BPM | WEIGHT: 143 LBS | DIASTOLIC BLOOD PRESSURE: 60 MMHG | TEMPERATURE: 97.3 F | OXYGEN SATURATION: 99 % | BODY MASS INDEX: 25.34 KG/M2

## 2021-07-21 DIAGNOSIS — R00.2 PALPITATIONS: ICD-10-CM

## 2021-07-21 PROCEDURE — 93000 ELECTROCARDIOGRAM COMPLETE: CPT | Performed by: PHYSICIAN ASSISTANT

## 2021-07-21 PROCEDURE — 99203 OFFICE O/P NEW LOW 30 MIN: CPT | Performed by: PHYSICIAN ASSISTANT

## 2021-07-21 ASSESSMENT — ENCOUNTER SYMPTOMS
SINUS PAIN: 0
FEVER: 0
TINGLING: 0
NAUSEA: 0
VOMITING: 0
MYALGIAS: 0
SORE THROAT: 1
WEAKNESS: 0
HEADACHES: 0
DIZZINESS: 0
NECK PAIN: 0
CHILLS: 0
SHORTNESS OF BREATH: 1
ABDOMINAL PAIN: 0
COUGH: 0
WEIGHT LOSS: 0
WHEEZING: 0
SPUTUM PRODUCTION: 0
PALPITATIONS: 1

## 2021-07-21 ASSESSMENT — FIBROSIS 4 INDEX: FIB4 SCORE: 0.79

## 2021-07-21 NOTE — PROGRESS NOTES
Subjective:   Lakia Rosario is a 31 y.o. female who presents for Breathing Problem (she get out of breath really fast, chest pain and she feels sstabing pain on her heart, she has pain on her throat no pain and no coughing, x1week)    HPI:  This is a very pleasant 31-year-old female presenting to the clinic with intermittent episodes of shortness of breath and chest pain.  She states her symptoms have been present for the last couple months.  They seem to come and go at random.  Over the last week episodes have been becoming more frequent.  She states her episodes happen predominantly at rest.  She states there is a fluttering sensation and she finds it hard to catch her breath.  She also has sharp stabbing sensations on her chest wall bilaterally.  Symptoms last for a few seconds and then resolved.  She occasionally feels slightly lightheaded.  She has associated sore throat when the event does occur.  States she has not had a cough.  Denies any preceding upper respiratory infection.  Denies any fevers, chills or myalgias.  Denies any lower extremity swelling, recent injury or surgery.  No history of clotting or cardiac history in the family.  She has not started any new medications.      Review of Systems   Constitutional: Negative for chills, fever, malaise/fatigue and weight loss.   HENT: Positive for sore throat. Negative for congestion and sinus pain.    Respiratory: Positive for shortness of breath. Negative for cough, sputum production and wheezing.    Cardiovascular: Positive for chest pain and palpitations. Negative for leg swelling.   Gastrointestinal: Negative for abdominal pain, nausea and vomiting.   Musculoskeletal: Negative for myalgias and neck pain.   Skin: Negative for rash.   Neurological: Negative for dizziness, tingling, weakness and headaches.       Medications:    • acetaminophen Tabs  • docusate sodium Caps  • ferrous sulfate  • ibuprofen Tabs  • PRENATAL 1 PO  • prenatal plus  "vitamin Tabs    Allergies: Nkda [no known drug allergy]    Problem List: Lakia Rosario does not have any pertinent problems on file.    Surgical History:  No past surgical history on file.    Past Social Hx: Lakia Rosario  reports that she has never smoked. She has never used smokeless tobacco. She reports that she does not drink alcohol and does not use drugs.     Past Family Hx:  Lakia Rosario family history is not on file.     Problem list, medications, and allergies reviewed by myself today in Epic.     Objective:     /60 (BP Location: Right arm, Patient Position: Sitting, BP Cuff Size: Adult)   Pulse 73   Temp 36.3 °C (97.3 °F) (Temporal)   Ht 1.6 m (5' 3\")   Wt 64.9 kg (143 lb)   SpO2 99%   BMI 25.33 kg/m²     Physical Exam  Constitutional:       General: She is not in acute distress.     Appearance: Normal appearance. She is not ill-appearing, toxic-appearing or diaphoretic.   HENT:      Head: Normocephalic and atraumatic.      Right Ear: Tympanic membrane, ear canal and external ear normal.      Left Ear: Tympanic membrane, ear canal and external ear normal.      Nose: Nose normal. No congestion or rhinorrhea.      Mouth/Throat:      Mouth: Mucous membranes are moist.      Pharynx: No oropharyngeal exudate or posterior oropharyngeal erythema.   Eyes:      Conjunctiva/sclera: Conjunctivae normal.   Neck:      Vascular: No carotid bruit.   Cardiovascular:      Rate and Rhythm: Normal rate and regular rhythm.      Pulses: Normal pulses.      Heart sounds: Normal heart sounds. No murmur heard.   No friction rub. No gallop.    Pulmonary:      Effort: Pulmonary effort is normal.      Breath sounds: Normal breath sounds. No wheezing, rhonchi or rales.      Comments: Patient has mild tenderness to palpation along the sternocostal junction bilaterally.  Chest:      Chest wall: Tenderness present.   Abdominal:      General: Bowel sounds are normal.      Palpations: Abdomen is soft. "      Tenderness: There is no abdominal tenderness. There is no guarding.   Musculoskeletal:      Cervical back: Normal range of motion. No muscular tenderness.   Lymphadenopathy:      Cervical: No cervical adenopathy.   Skin:     General: Skin is warm and dry.      Capillary Refill: Capillary refill takes less than 2 seconds.   Neurological:      General: No focal deficit present.      Mental Status: She is alert and oriented to person, place, and time. Mental status is at baseline.   Psychiatric:         Mood and Affect: Mood normal.         Thought Content: Thought content normal.       12- Lead EKG; interpreted by myself  Normal sinus rhythm with a rate of 66 bpm.   Normal axis.  Normal intervals.   No ST elevation or depression    No widening of QRS complex   Good R wave progression   No diagnostic Q waves.   No previous EKG for comparison.   Clinical Impression: Normal EKG      Assessment/Plan:     Comments/MDM:     • Differential diagnoses and treatment options were discussed with the patient at length today. Patient has been experiencing intermittent bouts of what is described as palpitations. Symptoms have been present for the last couple months seem to be becoming more frequent recently. She also has some mild tenderness to palpation over the sternal costal junction. Differentials at this time include palpitations versus costochondritis versus electrolyte abnormality versus anxiety versus PE. Wells score equals 0. Patient's EKG was performed in clinic with no acute findings. Vital signs stable and within normal limits. Lung sounds clear to auscultation. Encourage taking ibuprofen 600 mg 3 times daily for potential costochondritis inflammation. Discussed further imaging and lab work however the patient is self-pay during today's visit. She does have Medicaid so I encouraged her to go to [facility] urgent care as they accept Medicaid at that clinic and a further more affordable work-up can be performed. She does  have a pending appointment to establish care with a PCP on August 11. Discussed potential red flag symptoms that would warrant emergent follow-up. Encouraged to call with questions or concerns.     Diagnosis and associated orders:     1. Palpitations  EKG - Clinic Performed              Differential diagnosis, natural history, supportive care, and indications for immediate follow-up discussed.    Advised the patient to follow-up with the primary care physician for recheck, reevaluation, and consideration of further management.    Please note that this dictation was created using voice recognition software. I have made reasonable attempt to correct obvious errors, but I expect that there are errors of grammar and possibly content that I did not discover before finalizing the note.    This note was electronically signed by DARRELL Sosa PA-C

## 2023-08-08 ENCOUNTER — NON-PROVIDER VISIT (OUTPATIENT)
Dept: OCCUPATIONAL MEDICINE | Facility: CLINIC | Age: 33
End: 2023-08-08

## 2023-08-08 DIAGNOSIS — Z02.1 PRE-EMPLOYMENT HEALTH SCREENING EXAMINATION: ICD-10-CM

## 2023-08-08 DIAGNOSIS — Z02.1 PRE-EMPLOYMENT DRUG SCREENING: ICD-10-CM

## 2023-08-08 LAB
AMP AMPHETAMINE: NORMAL
COC COCAINE: NORMAL
INT CON NEG: NORMAL
INT CON POS: NORMAL
MET METHAMPHETAMINES: NORMAL
OPI OPIATES: NORMAL
PCP PHENCYCLIDINE: NORMAL
POC DRUG COMMENT 753798-OCCUPATIONAL HEALTH: NEGATIVE
THC: NORMAL

## 2023-08-08 PROCEDURE — 80305 DRUG TEST PRSMV DIR OPT OBS: CPT | Performed by: NURSE PRACTITIONER

## 2025-02-23 ENCOUNTER — OFFICE VISIT (OUTPATIENT)
Dept: URGENT CARE | Facility: PHYSICIAN GROUP | Age: 35
End: 2025-02-23
Payer: COMMERCIAL

## 2025-02-23 ENCOUNTER — RESULTS FOLLOW-UP (OUTPATIENT)
Dept: URGENT CARE | Facility: PHYSICIAN GROUP | Age: 35
End: 2025-02-23

## 2025-02-23 VITALS
BODY MASS INDEX: 28.88 KG/M2 | HEART RATE: 78 BPM | RESPIRATION RATE: 18 BRPM | OXYGEN SATURATION: 93 % | HEIGHT: 63 IN | TEMPERATURE: 98.3 F | SYSTOLIC BLOOD PRESSURE: 118 MMHG | WEIGHT: 163 LBS | DIASTOLIC BLOOD PRESSURE: 60 MMHG

## 2025-02-23 DIAGNOSIS — J02.9 SORE THROAT: ICD-10-CM

## 2025-02-23 DIAGNOSIS — B34.9 VIRAL SYNDROME: ICD-10-CM

## 2025-02-23 LAB
FLUAV RNA SPEC QL NAA+PROBE: NEGATIVE
FLUBV RNA SPEC QL NAA+PROBE: NEGATIVE
RSV RNA SPEC QL NAA+PROBE: NEGATIVE
SARS-COV-2 RNA RESP QL NAA+PROBE: NEGATIVE

## 2025-02-23 PROCEDURE — 99213 OFFICE O/P EST LOW 20 MIN: CPT | Performed by: NURSE PRACTITIONER

## 2025-02-23 PROCEDURE — 0241U POCT CEPHEID COV-2, FLU A/B, RSV - PCR: CPT | Performed by: NURSE PRACTITIONER

## 2025-02-23 PROCEDURE — 3074F SYST BP LT 130 MM HG: CPT | Performed by: NURSE PRACTITIONER

## 2025-02-23 PROCEDURE — 3078F DIAST BP <80 MM HG: CPT | Performed by: NURSE PRACTITIONER

## 2025-02-23 RX ORDER — BENZONATATE 100 MG/1
100 CAPSULE ORAL 3 TIMES DAILY PRN
Qty: 45 CAPSULE | Refills: 0 | Status: SHIPPED | OUTPATIENT
Start: 2025-02-23 | End: 2025-03-10

## 2025-02-23 RX ORDER — CETIRIZINE HYDROCHLORIDE 10 MG/1
10 TABLET ORAL DAILY
Qty: 30 TABLET | Refills: 0 | Status: SHIPPED | OUTPATIENT
Start: 2025-02-23

## 2025-02-23 RX ORDER — FLUTICASONE PROPIONATE 50 MCG
1 SPRAY, SUSPENSION (ML) NASAL DAILY
Qty: 16 G | Refills: 0 | Status: SHIPPED | OUTPATIENT
Start: 2025-02-23

## 2025-02-23 ASSESSMENT — ENCOUNTER SYMPTOMS
HOARSE VOICE: 1
COUGH: 1
VOMITING: 0
HEADACHES: 1
DIARRHEA: 0
TROUBLE SWALLOWING: 1

## 2025-02-23 NOTE — PATIENT INSTRUCTIONS
"Influenza, Adult  Influenza is also called \"the flu.\" It is an infection in the lungs, nose, and throat (respiratory tract). It spreads easily from person to person (is contagious). The flu causes symptoms that are like a cold, along with high fever and body aches.  What are the causes?  This condition is caused by the influenza virus. You can get the virus by:  Breathing in droplets that are in the air after a person infected with the flu coughed or sneezed.  Touching something that has the virus on it and then touching your mouth, nose, or eyes.  What increases the risk?  Certain things may make you more likely to get the flu. These include:  Not washing your hands often.  Having close contact with many people during cold and flu season.  Touching your mouth, eyes, or nose without first washing your hands.  Not getting a flu shot every year.  You may have a higher risk for the flu, and serious problems, such as a lung infection (pneumonia), if you:  Are older than 65.  Are pregnant.  Have a weakened disease-fighting system (immune system) because of a disease or because you are taking certain medicines.  Have a long-term (chronic) condition, such as:  Heart, kidney, or lung disease.  Diabetes.  Asthma.  Have a liver disorder.  Are very overweight (morbidly obese).  Have anemia.  What are the signs or symptoms?  Symptoms usually begin suddenly and last 4-14 days. They may include:  Fever and chills.  Headaches, body aches, or muscle aches.  Sore throat.  Cough.  Runny or stuffy (congested) nose.  Feeling discomfort in your chest.  Not wanting to eat as much as normal.  Feeling weak or tired.  Feeling dizzy.  Feeling sick to your stomach or throwing up.  How is this treated?  If the flu is found early, you can be treated with antiviral medicine. This can help to reduce how bad the illness is and how long it lasts. This may be given by mouth or through an IV tube.  Taking care of yourself at home can help your " symptoms get better. Your doctor may want you to:  Take over-the-counter medicines.  Drink plenty of fluids.  The flu often goes away on its own. If you have very bad symptoms or other problems, you may be treated in a hospital.  Follow these instructions at home:         Activity  Rest as needed. Get plenty of sleep.  Stay home from work or school as told by your doctor.  Do not leave home until you do not have a fever for 24 hours without taking medicine.  Leave home only to go to your doctor.  Eating and drinking  Take an ORS (oral rehydration solution). This is a drink that is sold at pharmacies and stores.  Drink enough fluid to keep your pee pale yellow.  Drink clear fluids in small amounts as you are able. Clear fluids include:  Water.  Ice chips.  Fruit juice mixed with water.  Low-calorie sports drinks.  Eat bland foods that are easy to digest. Eat small amounts as you are able. These foods include:  Bananas.  Applesauce.  Rice.  Lean meats.  Toast.  Crackers.  Do not eat or drink:  Fluids that have a lot of sugar or caffeine.  Alcohol.  Spicy or fatty foods.  General instructions  Take over-the-counter and prescription medicines only as told by your doctor.  Use a cool mist humidifier to add moisture to the air in your home. This can make it easier for you to breathe.  When using a cool mist humidifier, clean it daily. Empty water and replace with clean water.  Cover your mouth and nose when you cough or sneeze.  Wash your hands with soap and water often and for at least 20 seconds. This is also important after you cough or sneeze. If you cannot use soap and water, use alcohol-based hand .  Keep all follow-up visits.  How is this prevented?    Get a flu shot every year. You may get the flu shot in late summer, fall, or winter. Ask your doctor when you should get your flu shot.  Avoid contact with people who are sick during fall and winter. This is cold and flu season.  Contact a doctor if:  You  "get new symptoms.  You have:  Chest pain.  Watery poop (diarrhea).  A fever.  Your cough gets worse.  You start to have more mucus.  You feel sick to your stomach.  You throw up.  Get help right away if you:  Have shortness of breath.  Have trouble breathing.  Have skin or nails that turn a bluish color.  Have very bad pain or stiffness in your neck.  Get a sudden headache.  Get sudden pain in your face or ear.  Cannot eat or drink without throwing up.  These symptoms may represent a serious problem that is an emergency. Get medical help right away. Call your local emergency services (911 in the [place]  Do not wait to see if the symptoms will go away.  Do not drive yourself to the hospital.  Summary  Influenza is also called \"the flu.\" It is an infection in the lungs, nose, and throat. It spreads easily from person to person.  Take over-the-counter and prescription medicines only as told by your doctor.  Getting a flu shot every year is the best way to not get the flu.  This information is not intended to replace advice given to you by your health care provider. Make sure you discuss any questions you have with your health care provider.  Document Revised: 08/06/2021 Document Reviewed: 08/06/2021  Elsevier Patient Education © 2023 [employer]    "

## 2025-02-23 NOTE — PROGRESS NOTES
"Patient/parent/guardian has consented to treatment and for use of patient information for treatment and billing purposes.  Subjective:   Lakia Rosario  is a 35 y.o. female who presents for Pharyngitis (X6 days)       Pharyngitis   This is a new problem. The current episode started in the past 7 days. The problem has been gradually worsening. There has been no fever. The pain is at a severity of 8/10. The pain is moderate. Associated symptoms include congestion, coughing, ear pain, headaches, a hoarse voice and trouble swallowing. Pertinent negatives include no diarrhea or vomiting. Treatments tried: mucinex. The treatment provided mild relief.       Review of Systems   HENT:  Positive for congestion, ear pain, hoarse voice and trouble swallowing.    Respiratory:  Positive for cough.    Gastrointestinal:  Negative for diarrhea and vomiting.   Neurological:  Positive for headaches.         CURRENT MEDICATIONS:  ferrous sulfate  ibuprofen Tabs  Allergies:     Allergies   Allergen Reactions    Nkda [No Known Drug Allergy]      Current Problems: Lakia Rosario does not have any pertinent problems on file.  Past Surgical Hx:  No past surgical history on file.   Past Social Hx:  reports that she has never smoked. She has never used smokeless tobacco. She reports that she does not drink alcohol and does not use drugs.    Objective:   /60 (BP Location: Right arm, Patient Position: Sitting, BP Cuff Size: Adult)   Pulse 78   Temp 36.8 °C (98.3 °F) (Temporal)   Resp 18   Ht 1.6 m (5' 3\")   Wt 73.9 kg (163 lb)   SpO2 93%   BMI 28.87 kg/m²   Physical Exam  Vitals and nursing note reviewed.   Constitutional:       General: She is not in acute distress.     Appearance: Normal appearance. She is normal weight. She is ill-appearing.   HENT:      Head: Normocephalic and atraumatic.      Right Ear: External ear normal. A middle ear effusion is present. Tympanic membrane is retracted. Tympanic membrane is not " erythematous.      Left Ear: External ear normal. A middle ear effusion is present. Tympanic membrane is retracted. Tympanic membrane is not erythematous.      Nose: Mucosal edema, congestion and rhinorrhea present. Rhinorrhea is clear.      Right Sinus: No maxillary sinus tenderness or frontal sinus tenderness.      Left Sinus: No maxillary sinus tenderness or frontal sinus tenderness.      Mouth/Throat:      Mouth: Mucous membranes are moist.      Pharynx: Postnasal drip present. No oropharyngeal exudate, posterior oropharyngeal erythema or uvula swelling.   Eyes:      Extraocular Movements: Extraocular movements intact.      Conjunctiva/sclera: Conjunctivae normal.      Pupils: Pupils are equal, round, and reactive to light.   Cardiovascular:      Rate and Rhythm: Normal rate and regular rhythm.      Pulses: Normal pulses.      Heart sounds: Normal heart sounds, S1 normal and S2 normal.   Pulmonary:      Effort: Pulmonary effort is normal.      Breath sounds: Normal breath sounds. No decreased breath sounds, wheezing, rhonchi or rales.   Abdominal:      Palpations: Abdomen is soft.      Tenderness: There is no abdominal tenderness.   Musculoskeletal:         General: Normal range of motion.      Cervical back: Normal range of motion and neck supple.   Skin:     General: Skin is warm and dry.      Findings: No rash.   Neurological:      General: No focal deficit present.      Mental Status: She is alert and oriented to person, place, and time.   Psychiatric:         Mood and Affect: Mood normal.         Behavior: Behavior normal.       Results for orders placed or performed in visit on 02/23/25   POCT CEPHEID COV-2, FLU A/B, RSV - PCR    Collection Time: 02/23/25 10:26 AM   Result Value Ref Range    SARS-CoV-2 by PCR Negative Negative, Invalid    Influenza virus A RNA Negative Negative, Invalid    Influenza virus B, PCR Negative Negative, Invalid    RSV, PCR Negative Negative, Invalid       Assessment/Plan:   1.  Viral syndrome  - benzonatate (TESSALON) 100 MG Cap; Take 1 Capsule by mouth 3 times a day as needed for Cough for up to 15 days.  Dispense: 45 Capsule; Refill: 0  - fluticasone (FLONASE) 50 MCG/ACT nasal spray; Administer 1 Spray into affected nostril(S) every day.  Dispense: 16 g; Refill: 0  - cetirizine (ZYRTEC ALLERGY) 10 MG Tab; Take 1 Tablet by mouth every day.  Dispense: 30 Tablet; Refill: 0    2. Sore throat  - POCT CEPHEID COV-2, FLU A/B, RSV - PCR    Point-of-care testing completed.  Will contact patient with any positive results.  Symptom management medication sent to pharmacy with instructions for use.  Offered dexamethasone, patient has declined. Recommend adequate hydration, rest, deep breathing and coughing, ambulation as tolerated, OTC medications.  Recommend Delsym as it lasts 12 hours.  Recommend Coricidin cough and cold if taking hypertension medication. May use Ponaris nasal emollient, saline nasal spray for nasal congestion or to prevent nasal passage dryness or allergies.   Red flags, RTC and ER precautions discussed, with patient confirming their understanding of  need for immediate follow-up.  Discussed medication management options, risks and benefits, and alternatives to treatment plan agreed upon. Instructed to continue  medications without changes as ordered by primary care unless aforementioned above.  Patient expresses understanding and agrees to plan of care. All questions or concerns answered. For my MDM, I have personally reviewed previous notes, and test results as pertinent to today's visit.  10:40 AM  Point-of-care testing negative.  No change to treatment plan.  Please note that this dictation was created using voice recognition software. I have made every reasonable attempt to correct obvious errors,  but there may be grammar errors, and possibly content that I did not discover before finalizing the note.   This note was electronically signed by KERRI Uribe

## 2025-02-23 NOTE — LETTER
February 23, 2025    To Whom It May Concern:         This is confirmation that Lakia Jacque Rosario attended her scheduled appointment with KERRI Uribe on 2/23/25. Please excuse for illness.          If you have any questions please do not hesitate to call me at the phone number listed below.    Sincerely,          PAN Uribe.  [phone]

## 2025-06-01 ENCOUNTER — OFFICE VISIT (OUTPATIENT)
Dept: URGENT CARE | Facility: PHYSICIAN GROUP | Age: 35
End: 2025-06-01
Payer: COMMERCIAL

## 2025-06-01 VITALS
SYSTOLIC BLOOD PRESSURE: 126 MMHG | DIASTOLIC BLOOD PRESSURE: 70 MMHG | WEIGHT: 160 LBS | OXYGEN SATURATION: 98 % | HEART RATE: 82 BPM | BODY MASS INDEX: 28.35 KG/M2 | TEMPERATURE: 97.9 F | HEIGHT: 63 IN | RESPIRATION RATE: 12 BRPM

## 2025-06-01 DIAGNOSIS — H10.12 ALLERGIC CONJUNCTIVITIS OF LEFT EYE: Primary | ICD-10-CM

## 2025-06-01 PROCEDURE — 99213 OFFICE O/P EST LOW 20 MIN: CPT | Performed by: STUDENT IN AN ORGANIZED HEALTH CARE EDUCATION/TRAINING PROGRAM

## 2025-06-01 PROCEDURE — 3078F DIAST BP <80 MM HG: CPT | Performed by: STUDENT IN AN ORGANIZED HEALTH CARE EDUCATION/TRAINING PROGRAM

## 2025-06-01 PROCEDURE — 3074F SYST BP LT 130 MM HG: CPT | Performed by: STUDENT IN AN ORGANIZED HEALTH CARE EDUCATION/TRAINING PROGRAM

## 2025-06-01 RX ORDER — AZELASTINE HYDROCHLORIDE 0.5 MG/ML
1 SOLUTION/ DROPS OPHTHALMIC 2 TIMES DAILY
Qty: 6 ML | Refills: 0 | Status: SHIPPED | OUTPATIENT
Start: 2025-06-01 | End: 2025-06-08

## 2025-06-01 RX ORDER — KETOROLAC TROMETHAMINE 5 MG/ML
1 SOLUTION OPHTHALMIC 4 TIMES DAILY PRN
Qty: 5 ML | Refills: 0 | Status: SHIPPED | OUTPATIENT
Start: 2025-06-01 | End: 2025-06-08

## 2025-06-01 NOTE — PROGRESS NOTES
"Subjective:   Lakia Rosario is a 35 y.o. female who presents for Conjunctivitis (Left eye x 5 days )      HPI:  35-year-old female presents to the urgent care for approximately 4 days of left eye redness, swelling, burning sensation, dryness, and increased tearing.  Denies any sensation of foreign body within the eye.  Not having any yellow or green discharge.  No eyedrops at home or similar symptoms.  No fever, blurred vision, double vision, nasal congestion, runny nose.    Medications:    azelastine  cetirizine Tabs  ferrous sulfate  fluticasone  ibuprofen Tabs  ketorolac Soln    Allergies: Nkda [no known drug allergy]    Problem List: Lakia Rosario does not have any pertinent problems on file.    Surgical History:  No past surgical history on file.    Past Social Hx: Lakia Rosario  reports that she has never smoked. She has never used smokeless tobacco. She reports that she does not drink alcohol and does not use drugs.     Past Family Hx:  Lakia Rosario family history is not on file.     Problem list, medications, and allergies reviewed by myself today in Epic.     Objective:     /70 (BP Location: Right arm, Patient Position: Sitting, BP Cuff Size: Adult)   Pulse 82   Temp 36.6 °C (97.9 °F) (Temporal)   Resp 12   Ht 1.6 m (5' 3\")   Wt 72.6 kg (160 lb)   SpO2 98%   BMI 28.34 kg/m²     Physical Exam  Vitals reviewed.   HENT:      Nose: No congestion or rhinorrhea.   Eyes:      General: Lids are normal. Allergic shiner present.         Right eye: No foreign body, discharge or hordeolum.         Left eye: No foreign body, discharge or hordeolum.      Extraocular Movements: Extraocular movements intact.      Right eye: Normal extraocular motion.      Left eye: Normal extraocular motion.      Conjunctiva/sclera:      Left eye: Left conjunctiva is injected. Chemosis present. No exudate or hemorrhage.     Pupils: Pupils are equal, round, and reactive to light.      Comments: " No surrounding erythema or edema.   Cardiovascular:      Pulses: Normal pulses.   Pulmonary:      Effort: Pulmonary effort is normal.         Assessment/Plan:     Diagnosis and associated orders:     1. Allergic conjunctivitis of left eye  azelastine (OPTIVAR) 0.05 % ophthalmic solution    ketorolac (ACULAR) 0.5 % Solution         Comments/MDM:     Patient's presentation physical exam findings are consistent with allergic conjunctivitis of the left eye.  I do not suspect viral or bacterial etiology at this time.  No signs of foreign body.  Do not suspect preseptal or orbital cellulitis.  Azelastine eyedrops and ketorolac eyedrops prescribed.  Discussed continue the allergy medication such as Claritin, Zyrtec, or Allegra for the next week or so.         Differential diagnosis, natural history, supportive care, and indications for immediate follow-up discussed.    Advised the patient to follow-up with the primary care physician for recheck, reevaluation, and consideration of further management.    Please note that this dictation was created using voice recognition software. I have made a reasonable attempt to correct obvious errors, but I expect that there are errors of grammar and possibly content that I did not discover before finalizing the note.    Electronically signed by Bradley Collier PA-C.

## 2025-06-01 NOTE — LETTER
June 1, 2025    To Whom It May Concern:         This is confirmation that Lakia Jacque Rosario attended her scheduled appointment with Bradley Collier P.A.-C. on 6/01/25.  Excused from work on 6/1/2025 through 6/2/2025.  May return to work early feeling better.         If you have any questions please do not hesitate to call me at the phone number listed below.    Sincerely,          Bradley Collier P.A.-C.  [phone]

## 2025-06-18 ENCOUNTER — OFFICE VISIT (OUTPATIENT)
Dept: URGENT CARE | Facility: PHYSICIAN GROUP | Age: 35
End: 2025-06-18
Payer: COMMERCIAL

## 2025-06-18 VITALS
SYSTOLIC BLOOD PRESSURE: 100 MMHG | RESPIRATION RATE: 12 BRPM | HEIGHT: 63 IN | OXYGEN SATURATION: 98 % | BODY MASS INDEX: 28.35 KG/M2 | HEART RATE: 60 BPM | TEMPERATURE: 97.8 F | DIASTOLIC BLOOD PRESSURE: 60 MMHG | WEIGHT: 160 LBS

## 2025-06-18 DIAGNOSIS — R10.2 SUPRAPUBIC PAIN, ACUTE: ICD-10-CM

## 2025-06-18 DIAGNOSIS — R10.2 PELVIC PAIN: Primary | ICD-10-CM

## 2025-06-18 LAB
APPEARANCE UR: CLEAR
BILIRUB UR STRIP-MCNC: NORMAL MG/DL
COLOR UR AUTO: YELLOW
GLUCOSE UR STRIP.AUTO-MCNC: NORMAL MG/DL
KETONES UR STRIP.AUTO-MCNC: NORMAL MG/DL
LEUKOCYTE ESTERASE UR QL STRIP.AUTO: NORMAL
NITRITE UR QL STRIP.AUTO: NORMAL
PH UR STRIP.AUTO: 7 [PH] (ref 5–8)
POCT INT CON NEG: NEGATIVE
POCT INT CON POS: POSITIVE
POCT URINE PREGNANCY TEST: NEGATIVE
PROT UR QL STRIP: NORMAL MG/DL
RBC UR QL AUTO: NORMAL
SP GR UR STRIP.AUTO: 1.01
UROBILINOGEN UR STRIP-MCNC: 0.2 MG/DL

## 2025-06-18 PROCEDURE — 99214 OFFICE O/P EST MOD 30 MIN: CPT

## 2025-06-18 PROCEDURE — 3074F SYST BP LT 130 MM HG: CPT

## 2025-06-18 PROCEDURE — 81002 URINALYSIS NONAUTO W/O SCOPE: CPT

## 2025-06-18 PROCEDURE — 81025 URINE PREGNANCY TEST: CPT

## 2025-06-18 PROCEDURE — 3078F DIAST BP <80 MM HG: CPT

## 2025-06-18 NOTE — PROGRESS NOTES
Subjective:   Lakia Rosario is a 35 y.o. female who presents for Pelvic Pain (Hurts to walk, x 1 day , left work because of pain, no diarrhea, no constipation, px 6, px bilateral radiates towards hips )    Patient presents to the clinic for complaints of pelvic pain x 1 day.  Patient woke up with this bilateral pelvic pain yesterday that radiates to her hips. Pain is sharp, constant and worsened by waking. Intensity 7/10 level but 10/10 when she walks.    No hx of PCOS or gynecological history.   Has taken advil, which didn't help.   Patient denies chest pain, SOB, dizziness/lightheadedness/vertigo, nausea/vomiting/diarrhea, difficulty breathing or swallowing, palpitations or racing heart rate, blood in the stool, fever, chills, vaginal discharge/bleeding/itching/pain, pain with urination, abdominal pain, or back/flank pain.       Pelvic Pain        Review of Systems   Genitourinary:  Positive for pelvic pain.     Refer to [facility] for additional details.    During this visit, appropriate PPE was worn, and hand hygiene was performed.    PMH:  has a past medical history of Pregnancy.    She has no past medical history of Addisons disease ([facility]), Adrenal disorder ([facility]), Allergy, unspecified not elsewhere classified, Anemia, Anxiety, Arrhythmia, Arthritis, ASTHMA, Asthma, Asymptomatic human immunodeficiency virus (HIV) infection status ([facility]), Blood transfusion, Blood transfusion, without reported diagnosis, Cancer ([facility]), CHF (congestive heart failure) ([facility]), Chronic airway obstruction, not elsewhere classified, Clotting disorder ([facility]), Cushings syndrome ([facility]), Depression, Diabetes, Diabetic neuropathy ([facility]), Emphysema, GERD (gastroesophageal reflux disease), Glaucoma, Goiter, Headache(784.0), Headache, classical migraine, Heart attack (HCC), Heart murmur, Hyperlipidemia, Hypertension, IBD (inflammatory bowel disease), Kidney disease, Meningitis, Muscle disorder, Osteoporosis, unspecified, Parathyroid disorder ([facility]),  "Pituitary disease (HCC), Seizure (HCC), Sickle cell disease (HCC), Stroke (HCC), Substance abuse (HCC), Thyroid disease, Tuberculosis, Type II or unspecified type diabetes mellitus without mention of complication, not stated as uncontrolled, Ulcer, Unspecified cataract, or Urinary tract infection, site not specified.    MEDS: Current Medications[1]    ALLERGIES: Allergies[2]  SURGHX: Past Surgical History[3]  SOCHX:  reports that she has never smoked. She has never used smokeless tobacco. She reports that she does not drink alcohol and does not use drugs.    FH: Per HPI as applicable/pertinent.    Medications, Allergies, and current problem list reviewed today in Epic.     Objective:     /60 (BP Location: Right arm, Patient Position: Sitting, BP Cuff Size: Adult)   Pulse 60   Temp 36.6 °C (97.8 °F) (Temporal)   Resp 12   Ht 1.6 m (5' 3\")   Wt 72.6 kg (160 lb)   SpO2 98%     Physical Exam  Constitutional:       Appearance: Normal appearance.   HENT:      Head: Normocephalic.      Right Ear: External ear normal.      Left Ear: External ear normal.      Mouth/Throat:      Pharynx: Oropharynx is clear.   Eyes:      Extraocular Movements: Extraocular movements intact.      Conjunctiva/sclera: Conjunctivae normal.   Cardiovascular:      Rate and Rhythm: Normal rate and regular rhythm.      Pulses: Normal pulses.      Heart sounds: Normal heart sounds.   Pulmonary:      Effort: Pulmonary effort is normal.      Breath sounds: Normal breath sounds.   Abdominal:      General: Abdomen is flat. There is no distension.      Palpations: Abdomen is soft. There is no mass.      Tenderness: There is abdominal tenderness. There is rebound. There is no guarding. Negative signs include McBurney's sign.      Hernia: No hernia is present.          Comments: TTP to bilateral pelvic/pubic area with rebound tenderness.   Musculoskeletal:         General: Normal range of motion.   Skin:     General: Skin is warm and dry.      " Capillary Refill: Capillary refill takes less than 2 seconds.      Coloration: Skin is not jaundiced or pale.   Neurological:      General: No focal deficit present.      Mental Status: She is alert and oriented to person, place, and time.   Psychiatric:         Mood and Affect: Mood normal.         Behavior: Behavior normal.         Thought Content: Thought content normal.         Judgment: Judgment normal.           Lab Results/POC Test Results   Results for orders placed or performed in visit on 06/18/25   POCT Urinalysis    Collection Time: 06/18/25  2:35 PM   Result Value Ref Range    POC Color yellow Negative    POC Appearance clear Negative    POC Glucose neg Negative mg/dL    POC Bilirubin neg Negative mg/dL    POC Ketones neg Negative mg/dL    POC Specific Gravity 1.015 <1.005 - >1.030    POC Blood neg Negative    POC Urine PH 7.0 5.0 - 8.0    POC Protein neg Negative mg/dL    POC Urobiligen 0.2 Negative (0.2) mg/dL    POC Nitrites neg Negative    POC Leukocyte Esterase neg Negative   POCT Pregnancy    Collection Time: 06/18/25  2:35 PM   Result Value Ref Range    POC Urine Pregnancy Test Negative     Internal Control Positive Positive     Internal Control Negative Negative            Assessment/Plan:     Diagnosis and associated orders:     1. Pelvic pain  - POCT Urinalysis  - POCT Pregnancy  - CT-ABDOMEN-PELVIS W/O; Future    2. Suprapubic pain, acute     Comments/MDM:     Reviewed POC urinalysis and POC pregnancy test with patient, which both came out negative for abnormalities and negative for pregnancy.  CT of the abdomen/pelvis without contrast ordered stat for further imaging of the patient's pelvic/pubic symptoms.  This imaging scheduled medical assistant prior to patient's discharge. Discussed that they will receive a phone call or Z[employer] message from this provider regarding the results of the tests along with any changes with medication or treatment plan as appropriate.  Patient to utilize  Tylenol and Motrin for pain control as well as to monitor for red flag symptoms that require immediate emergency room visit.  Patient agreeable to this plan of care.  All questions answered.  Return to clinic if symptoms persist or worsen.  Red flag symptoms warranting emergency medical services discussed, including but not limited to chest pain, SOB, wheezing, difficulty breathing or swallowing, sensation of throat closing or mass in the throat, severe intractable headache, changes to vision or hearing, palpitations or racing heart rate, worsened pelvic/abdominal pain, vaginal bleeding, vaginal discharge, blood in the stool, fever greater than 103F despite medication management, dizziness/lightheadedness/vertigo, or nausea/vomiting/diarrhea.           Differential diagnosis, natural history, supportive care, and indications for immediate follow-up discussed.    Advised the patient to follow-up with the primary care physician for recheck, reevaluation, and consideration of further management.    Instructed patient to seek emergency medical attention via calling EMS or going to the Emergency Room for red flag symptoms, including but not limited to: chest pain, palpitations, fever greater than 103F, shortness of breath, wheezing, new or worsened numbness/tingling, focal or unilateral weakness, and bloody vomit/stool.     Please note that this dictation was created using voice recognition software. I have made a reasonable attempt to correct obvious errors, but I expect that there are errors of grammar and possibly content that I did not discover before finalizing the note.    This note was electronically signed by KERRI Xiong           [1]   Current Outpatient Medications:     fluticasone (FLONASE) 50 MCG/ACT nasal spray, Administer 1 Spray into affected nostril(S) every day., Disp: 16 g, Rfl: 0    cetirizine (ZYRTEC ALLERGY) 10 MG Tab, Take 1 Tablet by mouth every day., Disp: 30 Tablet, Rfl: 0     ibuprofen (MOTRIN) 800 MG Tab, Take 1 Tab by mouth 3 times a day as needed., Disp: 30 Tab, Rfl: 1    ferrous sulfate 325 (65 Fe) MG tablet, Take 1 Tab by mouth every day., Disp: 30 Tab, Rfl: 1  [2]   Allergies  Allergen Reactions    Nkda [No Known Drug Allergy]    [3] History reviewed. No pertinent surgical history.

## 2025-06-18 NOTE — LETTER
June 18, 2025         Patient: Lakia Rosario   YOB: 1990   Date of Visit: 6/18/2025           To Whom it May Concern:    Lakia Rosario was seen in my clinic on 6/18/2025.  Please excuse any days she may have missed.  She may return to work on 6/21/2025 or sooner if she feels better.    If you have any questions or concerns, please don't hesitate to call.        Sincerely,           PAN Xiong.  Electronically Signed

## 2025-06-19 ENCOUNTER — HOSPITAL ENCOUNTER (OUTPATIENT)
Dept: RADIOLOGY | Facility: MEDICAL CENTER | Age: 35
End: 2025-06-19
Payer: COMMERCIAL

## 2025-06-19 ENCOUNTER — RESULTS FOLLOW-UP (OUTPATIENT)
Dept: URGENT CARE | Facility: CLINIC | Age: 35
End: 2025-06-19
Payer: COMMERCIAL

## 2025-06-19 DIAGNOSIS — R10.2 PELVIC PAIN: ICD-10-CM

## 2025-06-19 DIAGNOSIS — R10.2 SUPRAPUBIC PAIN, ACUTE: ICD-10-CM

## 2025-06-19 PROCEDURE — 74176 CT ABD & PELVIS W/O CONTRAST: CPT

## 2025-06-19 NOTE — RESULT ENCOUNTER NOTE
Attempted to call patient regarding results - no answer. Voicemail not set up. Will re-attempt to call again later.

## 2025-06-21 NOTE — RESULT ENCOUNTER NOTE
Called patient regarding the results of her abdominal/pelvis CT.  Per patient, her symptoms have improved.  Instructed patient to follow-up with PCP for her symptoms or to the ER if symptoms become severe.  All patient questions answered.